# Patient Record
Sex: FEMALE | Race: WHITE | Employment: OTHER | URBAN - METROPOLITAN AREA
[De-identification: names, ages, dates, MRNs, and addresses within clinical notes are randomized per-mention and may not be internally consistent; named-entity substitution may affect disease eponyms.]

---

## 2017-03-07 ENCOUNTER — TELEPHONE (OUTPATIENT)
Dept: INTERNAL MEDICINE CLINIC | Facility: CLINIC | Age: 42
End: 2017-03-07

## 2017-03-07 RX ORDER — IRBESARTAN AND HYDROCHLOROTHIAZIDE 300; 12.5 MG/1; MG/1
1 TABLET, FILM COATED ORAL DAILY
Qty: 30 TABLET | Refills: 0 | Status: SHIPPED | OUTPATIENT
Start: 2017-03-07 | End: 2017-04-04

## 2017-04-04 ENCOUNTER — TELEPHONE (OUTPATIENT)
Dept: INTERNAL MEDICINE CLINIC | Facility: CLINIC | Age: 42
End: 2017-04-04

## 2017-04-04 RX ORDER — IRBESARTAN AND HYDROCHLOROTHIAZIDE 300; 12.5 MG/1; MG/1
1 TABLET, FILM COATED ORAL DAILY
Qty: 30 TABLET | Refills: 1 | Status: SHIPPED | OUTPATIENT
Start: 2017-04-04 | End: 2018-09-19 | Stop reason: ALTCHOICE

## 2018-07-23 ENCOUNTER — PRIOR ORIGINAL RECORDS (OUTPATIENT)
Dept: OTHER | Age: 43
End: 2018-07-23

## 2018-08-30 ENCOUNTER — PRIOR ORIGINAL RECORDS (OUTPATIENT)
Dept: OTHER | Age: 43
End: 2018-08-30

## 2018-08-31 ENCOUNTER — PRIOR ORIGINAL RECORDS (OUTPATIENT)
Dept: OTHER | Age: 43
End: 2018-08-31

## 2018-09-04 LAB
ALBUMIN: 4.5 G/DL
ALKALINE PHOSPHATATE(ALK PHOS): 145 IU/L
BILIRUBIN TOTAL: 0.6 MG/DL
BUN: 13 MG/DL
CALCIUM: 10.8 MG/DL
CHLORIDE: 95 MEQ/L
CREATININE, SERUM: 1.1 MG/DL
GLOBULIN: 3.8 G/DL
GLUCOSE: 148 MG/DL
HEMATOCRIT: 49.3 %
HEMOGLOBIN: 17.7 G/DL
PLATELETS: 303 K/UL
POTASSIUM, SERUM: 4.1 MEQ/L
PROTEIN, TOTAL: 8.3 G/DL
RED BLOOD COUNT: 5.84 X 10-6/U
SGOT (AST): 396 IU/L
SGPT (ALT): 77 IU/L
SODIUM: 136 MEQ/L
WHITE BLOOD COUNT: 22.2 X 10-3/U

## 2018-09-05 ENCOUNTER — TELEPHONE (OUTPATIENT)
Dept: INTERNAL MEDICINE CLINIC | Facility: CLINIC | Age: 43
End: 2018-09-05

## 2018-09-05 DIAGNOSIS — Z79.01 ANTICOAGULATED ON COUMADIN: Primary | ICD-10-CM

## 2018-09-06 ENCOUNTER — APPOINTMENT (OUTPATIENT)
Dept: LAB | Age: 43
End: 2018-09-06
Attending: FAMILY MEDICINE
Payer: COMMERCIAL

## 2018-09-06 DIAGNOSIS — Z79.01 ANTICOAGULATED ON COUMADIN: ICD-10-CM

## 2018-09-06 LAB
INR BLD: 2.54 (ref 0.9–1.1)
PSA SERPL DL<=0.01 NG/ML-MCNC: 28.2 SECONDS (ref 12.4–14.7)

## 2018-09-06 PROCEDURE — 85610 PROTHROMBIN TIME: CPT | Performed by: FAMILY MEDICINE

## 2018-09-06 PROCEDURE — 36415 COLL VENOUS BLD VENIPUNCTURE: CPT | Performed by: FAMILY MEDICINE

## 2018-09-06 NOTE — TELEPHONE ENCOUNTER
See Lab Steven Irby when speaking with mother yesterday pt taking coumadin  2.5mg alternate with 5 mg QOD

## 2018-09-10 RX ORDER — IRBESARTAN AND HYDROCHLOROTHIAZIDE 300; 12.5 MG/1; MG/1
1 TABLET, FILM COATED ORAL DAILY
Qty: 30 TABLET | Refills: 1 | OUTPATIENT
Start: 2018-09-10

## 2018-09-10 NOTE — TELEPHONE ENCOUNTER
[address] [facility], [address] [facility]  [address] [address] [facility] [phone], [phone]  Irbesartan-Hydrochlorothiazide 300-12.5 MG

## 2018-09-11 ENCOUNTER — TELEPHONE (OUTPATIENT)
Dept: INTERNAL MEDICINE CLINIC | Facility: CLINIC | Age: 43
End: 2018-09-11

## 2018-09-11 RX ORDER — WARFARIN SODIUM 5 MG/1
TABLET ORAL
Qty: 30 TABLET | Refills: 0 | Status: SHIPPED | OUTPATIENT
Start: 2018-09-11 | End: 2018-10-12

## 2018-09-11 NOTE — TELEPHONE ENCOUNTER
Appt 09/19/18 with Dr Claudia Abdi  Last INR 2.54 on 09/06/18 taking coumadin 2.5mg alternate with 5 mg QOD Told at that time recheck 2 weeks

## 2018-09-11 NOTE — TELEPHONE ENCOUNTER
Mother informed still need refill of coumadin 5 mg tabs only has enough thru tomorrow  Send to [facility] [address]

## 2018-09-11 NOTE — TELEPHONE ENCOUNTER
Refill for Warfarin? When will her INR be checked again, it was checked 9/6/18. Should they be concerned about menstrual period  12 day?   She states she left a message yesterday also

## 2018-09-19 ENCOUNTER — APPOINTMENT (OUTPATIENT)
Dept: LAB | Age: 43
End: 2018-09-19
Attending: FAMILY MEDICINE
Payer: COMMERCIAL

## 2018-09-19 ENCOUNTER — OFFICE VISIT (OUTPATIENT)
Dept: INTERNAL MEDICINE CLINIC | Facility: CLINIC | Age: 43
End: 2018-09-19
Payer: COMMERCIAL

## 2018-09-19 VITALS — BODY MASS INDEX: 34.65 KG/M2 | HEIGHT: 63.5 IN | WEIGHT: 198 LBS

## 2018-09-19 DIAGNOSIS — Z95.810 CARDIAC DEFIBRILLATOR IN PLACE: ICD-10-CM

## 2018-09-19 DIAGNOSIS — I25.10 CORONARY ARTERY DISEASE INVOLVING NATIVE CORONARY ARTERY OF NATIVE HEART WITHOUT ANGINA PECTORIS: Primary | ICD-10-CM

## 2018-09-19 DIAGNOSIS — F32.A DEPRESSION, UNSPECIFIED DEPRESSION TYPE: ICD-10-CM

## 2018-09-19 DIAGNOSIS — R41.89 COGNITIVE CHANGE: ICD-10-CM

## 2018-09-19 DIAGNOSIS — Z12.31 VISIT FOR SCREENING MAMMOGRAM: ICD-10-CM

## 2018-09-19 DIAGNOSIS — I10 ESSENTIAL HYPERTENSION: ICD-10-CM

## 2018-09-19 DIAGNOSIS — R73.01 IMPAIRED FASTING GLUCOSE: ICD-10-CM

## 2018-09-19 DIAGNOSIS — I25.10 CORONARY ARTERY DISEASE INVOLVING NATIVE CORONARY ARTERY OF NATIVE HEART WITHOUT ANGINA PECTORIS: ICD-10-CM

## 2018-09-19 PROCEDURE — 36415 COLL VENOUS BLD VENIPUNCTURE: CPT | Performed by: FAMILY MEDICINE

## 2018-09-19 PROCEDURE — 99396 PREV VISIT EST AGE 40-64: CPT | Performed by: FAMILY MEDICINE

## 2018-09-19 PROCEDURE — 85610 PROTHROMBIN TIME: CPT | Performed by: FAMILY MEDICINE

## 2018-09-19 RX ORDER — ATORVASTATIN CALCIUM 80 MG/1
40 TABLET, FILM COATED ORAL DAILY
COMMUNITY

## 2018-09-19 RX ORDER — CARVEDILOL 6.25 MG/1
6.25 TABLET ORAL 2 TIMES DAILY WITH MEALS
Status: ON HOLD | COMMUNITY
End: 2020-01-03

## 2018-09-19 RX ORDER — PAROXETINE HYDROCHLORIDE 25 MG/1
25 TABLET, FILM COATED, EXTENDED RELEASE ORAL EVERY MORNING
COMMUNITY
End: 2018-11-28

## 2018-09-19 RX ORDER — LISINOPRIL 2.5 MG/1
5 TABLET ORAL DAILY
COMMUNITY

## 2018-09-19 NOTE — PROGRESS NOTES
HPI:    Patient ID: Manpreet Mullins is a 38year old female.     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

## 2018-09-20 LAB
INR BLD: 1.92 (ref 0.9–1.1)
PSA SERPL DL<=0.01 NG/ML-MCNC: 22.6 SECONDS (ref 12.4–14.7)

## 2018-10-02 ENCOUNTER — PRIOR ORIGINAL RECORDS (OUTPATIENT)
Dept: OTHER | Age: 43
End: 2018-10-02

## 2018-10-02 ENCOUNTER — HOSPITAL ENCOUNTER (OUTPATIENT)
Dept: LAB | Facility: HOSPITAL | Age: 43
Discharge: HOME | End: 2018-10-02
Attending: INTERNAL MEDICINE
Payer: COMMERCIAL

## 2018-10-02 LAB — INR: NORMAL

## 2018-10-02 PROCEDURE — 85610 PROTHROMBIN TIME: CPT | Performed by: INTERNAL MEDICINE

## 2018-10-02 PROCEDURE — 36415 COLL VENOUS BLD VENIPUNCTURE: CPT | Performed by: INTERNAL MEDICINE

## 2018-10-05 NOTE — PATIENT INSTRUCTIONS
Refill policies:    • Allow 2-3 business days for refills; controlled substances may take longer.   • Contact your pharmacy at least 5 days prior to running out of medication and have them send an electronic request or submit request through the “request re entire amount billed. Precertification and Prior Authorizations: If your physician has recommended that you have a procedure or additional testing performed.   [facility]) will contact your insurance carrier to obtain pre-certi cleaned out of your hair with a warm washcloth before you leave. If you like, you can bring a hat to wear after the test or put your hair in a ponytail.    · You will be asked to relax with your eyes closed for a majority of the test and take a nap, if pos

## 2018-10-05 NOTE — PROGRESS NOTES
[address]  Neurology Consultation    Date of consult: 10/5/2018    Reason for consult: cognitive deficit     HPI: Jalyn Do is a 38year old female with past medical history as listed below presents with cognitive deficit after cardiac i cancer      Physical Examination:  /66   Pulse 66   Resp 14   Wt 198 lb   BMI 34.52 kg/m²   Lungs: clear to auscultation   CV: S1, S2 +  Abdomen: soft, non tender, no masses  Extremities: no edema  Carotid bruits: no  Neurological Examination:  Menta

## 2018-10-10 ENCOUNTER — APPOINTMENT (OUTPATIENT)
Dept: LAB | Age: 43
End: 2018-10-10
Attending: FAMILY MEDICINE
Payer: COMMERCIAL

## 2018-10-10 ENCOUNTER — PRIOR ORIGINAL RECORDS (OUTPATIENT)
Dept: OTHER | Age: 43
End: 2018-10-10

## 2018-10-10 ENCOUNTER — ANTI-COAG VISIT (OUTPATIENT)
Dept: INTERNAL MEDICINE CLINIC | Facility: CLINIC | Age: 43
End: 2018-10-10

## 2018-10-10 DIAGNOSIS — Z79.01 ANTICOAGULATED ON COUMADIN: ICD-10-CM

## 2018-10-10 LAB — INR: 2.3

## 2018-10-10 PROCEDURE — 93793 ANTICOAG MGMT PT WARFARIN: CPT | Performed by: FAMILY MEDICINE

## 2018-10-10 PROCEDURE — 85610 PROTHROMBIN TIME: CPT | Performed by: FAMILY MEDICINE

## 2018-10-10 PROCEDURE — 36415 COLL VENOUS BLD VENIPUNCTURE: CPT | Performed by: FAMILY MEDICINE

## 2018-10-11 ENCOUNTER — OFFICE VISIT (OUTPATIENT)
Dept: NUTRITION | Facility: HOSPITAL | Age: 43
End: 2018-10-11
Attending: INTERNAL MEDICINE
Payer: COMMERCIAL

## 2018-10-11 PROCEDURE — 97802 MEDICAL NUTRITION INDIV IN: CPT

## 2018-10-11 NOTE — PROGRESS NOTES
ADULT INITIAL OUTPATIENT NUTRITION CONSULTATION    Nutrition Assessment    Medical Diagnosis: CAD, HTN, Obesity    PMH: heart attack 7/18    Client Hx: 38year old female    Meds: noted    Labs: no recent    ANTHROPOMETRICS:  Ht: 5'4\"  Wt: 198#  BMI: 3 log intake via phone ishmael, MyFitnessPal and monitor nutrients. Written materials were issued and explained, all questions answered at this time.  Pt has set goals to follow recommendations set today, track intake using phone ishmael, MFP, and contact RD with fur

## 2018-10-13 ENCOUNTER — NURSE ONLY (OUTPATIENT)
Dept: NEUROLOGY | Facility: CLINIC | Age: 43
End: 2018-10-13
Payer: COMMERCIAL

## 2018-10-13 DIAGNOSIS — R41.89 COGNITIVE DEFICITS: ICD-10-CM

## 2018-10-13 PROCEDURE — 95816 EEG AWAKE AND DROWSY: CPT | Performed by: OTHER

## 2018-10-15 NOTE — PROCEDURES
Date of Procedure: 10/13/2018    Procedure: EEG (ELECTROENCEPHALOGRAM)     DX: COGNITIVE DEFICIT  HX: PT IS A 41YO FEMALE WHO PRESENTS AFTER CARDIAC ISSUES IN JULY OF THIS YEAR. PTS HAD STENT PLACEMENT AND STATES HEART WAS SHOCKED TWICE TO RESTORE RHYTHM.

## 2018-10-16 RX ORDER — WARFARIN SODIUM 5 MG/1
TABLET ORAL
Qty: 30 TABLET | Refills: 0 | Status: SHIPPED | OUTPATIENT
Start: 2018-10-16 | End: 2018-12-31

## 2018-10-18 RX ORDER — WARFARIN SODIUM 5 MG/1
TABLET ORAL
Qty: 30 TABLET | Refills: 0 | Status: SHIPPED | OUTPATIENT
Start: 2018-10-18 | End: 2018-10-26

## 2018-10-19 NOTE — IMAGING NOTE
Darling Reed from [facility] called back states ok for Crystal to take off life vest for MRI.

## 2018-10-19 NOTE — IMAGING NOTE
Spoke to pt mom, pt mom states pt saw dr. George Wilson cardiology. Called [phone] left message for Candelaria ORTEGA to call back about pt taking life vest off during MRI.

## 2018-10-19 NOTE — IMAGING NOTE
Pt mother called back and states she will take her life vest off during MRI.  That is what she has done in the past.

## 2018-10-22 ENCOUNTER — HOSPITAL ENCOUNTER (OUTPATIENT)
Dept: MRI IMAGING | Facility: HOSPITAL | Age: 43
Discharge: HOME | End: 2018-10-22
Attending: Other
Payer: COMMERCIAL

## 2018-10-22 ENCOUNTER — HOSPITAL ENCOUNTER (OUTPATIENT)
Dept: ULTRASOUND IMAGING | Facility: HOSPITAL | Age: 43
Discharge: HOME | End: 2018-10-22
Attending: Other
Payer: COMMERCIAL

## 2018-10-22 ENCOUNTER — PRIOR ORIGINAL RECORDS (OUTPATIENT)
Dept: OTHER | Age: 43
End: 2018-10-22

## 2018-10-22 VITALS
DIASTOLIC BLOOD PRESSURE: 62 MMHG | HEART RATE: 109 BPM | RESPIRATION RATE: 20 BRPM | OXYGEN SATURATION: 98 % | SYSTOLIC BLOOD PRESSURE: 116 MMHG

## 2018-10-22 DIAGNOSIS — R41.89 COGNITIVE CHANGES: ICD-10-CM

## 2018-10-22 DIAGNOSIS — I63.439 CEREBROVASCULAR ACCIDENT (CVA) DUE TO EMBOLISM OF POSTERIOR CEREBRAL ARTERY, UNSPECIFIED BLOOD VESSEL LATERALITY (HCC): ICD-10-CM

## 2018-10-22 PROCEDURE — 70553 MRI BRAIN STEM W/O & W/DYE: CPT | Performed by: OTHER

## 2018-10-22 PROCEDURE — 93880 EXTRACRANIAL BILAT STUDY: CPT | Performed by: OTHER

## 2018-10-22 PROCEDURE — A9575 INJ GADOTERATE MEGLUMI 0.1ML: HCPCS

## 2018-10-24 ENCOUNTER — LAB ENCOUNTER (OUTPATIENT)
Dept: LAB | Age: 43
End: 2018-10-24
Attending: INTERNAL MEDICINE
Payer: COMMERCIAL

## 2018-10-24 ENCOUNTER — PRIOR ORIGINAL RECORDS (OUTPATIENT)
Dept: OTHER | Age: 43
End: 2018-10-24

## 2018-10-24 DIAGNOSIS — Z79.01 LONG TERM (CURRENT) USE OF ANTICOAGULANTS: Primary | ICD-10-CM

## 2018-10-24 LAB — INR: 1.7

## 2018-10-24 PROCEDURE — 85610 PROTHROMBIN TIME: CPT

## 2018-10-24 PROCEDURE — 36415 COLL VENOUS BLD VENIPUNCTURE: CPT

## 2018-10-26 ENCOUNTER — CARDPULM VISIT (OUTPATIENT)
Dept: CARDIAC REHAB | Facility: HOSPITAL | Age: 43
End: 2018-10-26
Attending: INTERNAL MEDICINE
Payer: COMMERCIAL

## 2018-10-30 ENCOUNTER — HOSPITAL (OUTPATIENT)
Dept: OTHER | Age: 43
End: 2018-10-30
Attending: INTERNAL MEDICINE

## 2018-11-01 ENCOUNTER — PRIOR ORIGINAL RECORDS (OUTPATIENT)
Dept: OTHER | Age: 43
End: 2018-11-01

## 2018-11-02 ENCOUNTER — PRIOR ORIGINAL RECORDS (OUTPATIENT)
Dept: OTHER | Age: 43
End: 2018-11-02

## 2018-11-02 ENCOUNTER — TELEPHONE (OUTPATIENT)
Dept: NEUROLOGY | Facility: CLINIC | Age: 43
End: 2018-11-02

## 2018-11-05 ENCOUNTER — CARDPULM VISIT (OUTPATIENT)
Dept: CARDIAC REHAB | Facility: HOSPITAL | Age: 43
End: 2018-11-05
Attending: INTERNAL MEDICINE
Payer: COMMERCIAL

## 2018-11-05 PROCEDURE — 93798 PHYS/QHP OP CAR RHAB W/ECG: CPT

## 2018-11-06 ENCOUNTER — APPOINTMENT (OUTPATIENT)
Dept: LAB | Age: 43
End: 2018-11-06
Attending: FAMILY MEDICINE
Payer: COMMERCIAL

## 2018-11-06 ENCOUNTER — PRIOR ORIGINAL RECORDS (OUTPATIENT)
Dept: OTHER | Age: 43
End: 2018-11-06

## 2018-11-06 ENCOUNTER — MYAURORA ACCOUNT LINK (OUTPATIENT)
Dept: OTHER | Age: 43
End: 2018-11-06

## 2018-11-06 ENCOUNTER — HOSPITAL ENCOUNTER (OUTPATIENT)
Dept: CV DIAGNOSTICS | Age: 43
Discharge: HOME | End: 2018-11-06
Attending: INTERNAL MEDICINE
Payer: COMMERCIAL

## 2018-11-06 DIAGNOSIS — Z79.01 LONG TERM (CURRENT) USE OF ANTICOAGULANTS: ICD-10-CM

## 2018-11-06 DIAGNOSIS — I25.5 ISCHEMIC CARDIOMYOPATHY: ICD-10-CM

## 2018-11-06 LAB — INR: 2.36

## 2018-11-06 PROCEDURE — 85610 PROTHROMBIN TIME: CPT

## 2018-11-06 PROCEDURE — 36415 COLL VENOUS BLD VENIPUNCTURE: CPT

## 2018-11-07 ENCOUNTER — CARDPULM VISIT (OUTPATIENT)
Dept: CARDIAC REHAB | Facility: HOSPITAL | Age: 43
End: 2018-11-07
Attending: INTERNAL MEDICINE
Payer: COMMERCIAL

## 2018-11-07 PROCEDURE — 93798 PHYS/QHP OP CAR RHAB W/ECG: CPT

## 2018-11-08 ENCOUNTER — TELEPHONE (OUTPATIENT)
Dept: NEUROLOGY | Facility: CLINIC | Age: 43
End: 2018-11-08

## 2018-11-08 NOTE — TELEPHONE ENCOUNTER
Pt went to the [place] office instead of the [place] office for f/up with Dr Immanuel Charles. [place] office sent pt to [place]. Unfortunately, pt arrived 35 minutes late for appointment and  was unable to see patient due to a full morning schedule.

## 2018-11-09 ENCOUNTER — CARDPULM VISIT (OUTPATIENT)
Dept: CARDIAC REHAB | Facility: HOSPITAL | Age: 43
End: 2018-11-09
Attending: INTERNAL MEDICINE
Payer: COMMERCIAL

## 2018-11-09 ENCOUNTER — PRIOR ORIGINAL RECORDS (OUTPATIENT)
Dept: OTHER | Age: 43
End: 2018-11-09

## 2018-11-09 PROCEDURE — 93798 PHYS/QHP OP CAR RHAB W/ECG: CPT

## 2018-11-12 ENCOUNTER — PRIOR ORIGINAL RECORDS (OUTPATIENT)
Dept: OTHER | Age: 43
End: 2018-11-12

## 2018-11-12 ENCOUNTER — CARDPULM VISIT (OUTPATIENT)
Dept: CARDIAC REHAB | Facility: HOSPITAL | Age: 43
End: 2018-11-12
Attending: INTERNAL MEDICINE
Payer: COMMERCIAL

## 2018-11-12 PROCEDURE — 93798 PHYS/QHP OP CAR RHAB W/ECG: CPT

## 2018-11-13 ENCOUNTER — PRIOR ORIGINAL RECORDS (OUTPATIENT)
Dept: OTHER | Age: 43
End: 2018-11-13

## 2018-11-13 ENCOUNTER — MYAURORA ACCOUNT LINK (OUTPATIENT)
Dept: OTHER | Age: 43
End: 2018-11-13

## 2018-11-14 ENCOUNTER — CARDPULM VISIT (OUTPATIENT)
Dept: CARDIAC REHAB | Facility: HOSPITAL | Age: 43
End: 2018-11-14
Attending: INTERNAL MEDICINE
Payer: COMMERCIAL

## 2018-11-14 PROCEDURE — 93798 PHYS/QHP OP CAR RHAB W/ECG: CPT

## 2018-11-16 ENCOUNTER — CARDPULM VISIT (OUTPATIENT)
Dept: CARDIAC REHAB | Facility: HOSPITAL | Age: 43
End: 2018-11-16
Attending: INTERNAL MEDICINE
Payer: COMMERCIAL

## 2018-11-16 PROCEDURE — 93798 PHYS/QHP OP CAR RHAB W/ECG: CPT

## 2018-11-19 ENCOUNTER — CARDPULM VISIT (OUTPATIENT)
Dept: CARDIAC REHAB | Facility: HOSPITAL | Age: 43
End: 2018-11-19
Attending: INTERNAL MEDICINE
Payer: COMMERCIAL

## 2018-11-19 PROCEDURE — 93798 PHYS/QHP OP CAR RHAB W/ECG: CPT

## 2018-11-21 ENCOUNTER — PRIOR ORIGINAL RECORDS (OUTPATIENT)
Dept: OTHER | Age: 43
End: 2018-11-21

## 2018-11-21 ENCOUNTER — CARDPULM VISIT (OUTPATIENT)
Dept: CARDIAC REHAB | Facility: HOSPITAL | Age: 43
End: 2018-11-21
Attending: INTERNAL MEDICINE
Payer: COMMERCIAL

## 2018-11-21 PROCEDURE — 93798 PHYS/QHP OP CAR RHAB W/ECG: CPT

## 2018-11-23 ENCOUNTER — CARDPULM VISIT (OUTPATIENT)
Dept: CARDIAC REHAB | Facility: HOSPITAL | Age: 43
End: 2018-11-23
Attending: INTERNAL MEDICINE
Payer: COMMERCIAL

## 2018-11-23 PROCEDURE — 93798 PHYS/QHP OP CAR RHAB W/ECG: CPT

## 2018-11-26 ENCOUNTER — CARDPULM VISIT (OUTPATIENT)
Dept: CARDIAC REHAB | Facility: HOSPITAL | Age: 43
End: 2018-11-26
Attending: INTERNAL MEDICINE
Payer: COMMERCIAL

## 2018-11-26 PROCEDURE — 93798 PHYS/QHP OP CAR RHAB W/ECG: CPT

## 2018-11-27 ENCOUNTER — OFFICE VISIT (OUTPATIENT)
Dept: NEUROLOGY | Facility: CLINIC | Age: 43
End: 2018-11-27
Payer: COMMERCIAL

## 2018-11-27 VITALS
BODY MASS INDEX: 36 KG/M2 | RESPIRATION RATE: 16 BRPM | DIASTOLIC BLOOD PRESSURE: 74 MMHG | SYSTOLIC BLOOD PRESSURE: 118 MMHG | HEART RATE: 80 BPM | WEIGHT: 205 LBS

## 2018-11-27 DIAGNOSIS — I63.332 CEREBROVASCULAR ACCIDENT (CVA) DUE TO THROMBOSIS OF LEFT POSTERIOR CEREBRAL ARTERY (HCC): Primary | ICD-10-CM

## 2018-11-27 PROCEDURE — 99215 OFFICE O/P EST HI 40 MIN: CPT | Performed by: OTHER

## 2018-11-27 NOTE — PROGRESS NOTES
Patient here to follow up regarding cognitive impairment. Has been doing better since last visit. Has completed testing, here to discuss the results and next steps.

## 2018-11-27 NOTE — PROGRESS NOTES
[facility] Neurology outpatient progress note  Date of service: 11/27/2018    Patient here to follow up regarding cognitive impairment. Has been doing slightly better since last visit. Has completed testing, here to discuss the results and next steps.   [address] [place] [address] Drive increase blood flow.  implanted in groin     Social History:  Social History    Tobacco Use      Smoking status: Former Smoker        Packs/day: 0.00        Years: 0.00        Pack years: 0        Quit date: 2018        Years since quittin.3      S PCA territory stroke  CAD s/p stent  HTN     Recommendations:  Cont current meds per Cardiology/PCP, monitor INR and signs of bleeding  Cardiology, PCP follow up  Brain exercise games advised  Stroke education given  Stroke risk factors management by prima

## 2018-11-28 ENCOUNTER — PATIENT MESSAGE (OUTPATIENT)
Dept: INTERNAL MEDICINE CLINIC | Facility: CLINIC | Age: 43
End: 2018-11-28

## 2018-11-28 ENCOUNTER — CARDPULM VISIT (OUTPATIENT)
Dept: CARDIAC REHAB | Facility: HOSPITAL | Age: 43
End: 2018-11-28
Attending: INTERNAL MEDICINE
Payer: COMMERCIAL

## 2018-11-28 PROCEDURE — 93798 PHYS/QHP OP CAR RHAB W/ECG: CPT

## 2018-11-28 NOTE — TELEPHONE ENCOUNTER
From: Tu Killian  To: Loretta Reyes MD  Sent: 11/28/2018 3:28 PM CST  Subject: Prescription Question    Would you please send a prescription for paroxetine er 25mg once daily to [address]  I have my follow up on 12-12 at three p.m. thank

## 2018-11-29 RX ORDER — PAROXETINE HYDROCHLORIDE 25 MG/1
25 TABLET, FILM COATED, EXTENDED RELEASE ORAL EVERY MORNING
Qty: 30 TABLET | Refills: 0 | Status: SHIPPED | OUTPATIENT
Start: 2018-11-29 | End: 2018-12-31

## 2018-11-29 NOTE — PROGRESS NOTES
Refill requested:   Requested Prescriptions     Pending Prescriptions Disp Refills   • PARoxetine HCl ER 25 MG Oral Tablet 24 Hr 30 tablet 0     Sig: Take 1 tablet (25 mg total) by mouth every morning.        Failed protocol    Last refill: - entered histor cancel.   Please verify with your primary care provider if your insurance requires a referral.    Dec 14, 2018 10:45 AM CST CARDIAC REHAB PHASE II with [id number] [facility] [address] CARDIAC REHAB PHASE II with [address] [facility])    Jan 11, 2019 10:45 AM CST CARDIAC REHAB PHASE II with [address] [facility] [place]

## 2018-11-30 ENCOUNTER — APPOINTMENT (OUTPATIENT)
Dept: CARDIAC REHAB | Facility: HOSPITAL | Age: 43
End: 2018-11-30
Attending: INTERNAL MEDICINE
Payer: COMMERCIAL

## 2018-12-03 ENCOUNTER — APPOINTMENT (OUTPATIENT)
Dept: CARDIAC REHAB | Facility: HOSPITAL | Age: 43
End: 2018-12-03
Attending: INTERNAL MEDICINE
Payer: COMMERCIAL

## 2018-12-03 ENCOUNTER — TELEPHONE (OUTPATIENT)
Dept: INTERNAL MEDICINE CLINIC | Facility: CLINIC | Age: 43
End: 2018-12-03

## 2018-12-03 ENCOUNTER — PRIOR ORIGINAL RECORDS (OUTPATIENT)
Dept: OTHER | Age: 43
End: 2018-12-03

## 2018-12-03 LAB — INR: 1.5

## 2018-12-03 NOTE — TELEPHONE ENCOUNTER
called and stated that his wife is currently in [place] and she needs to have her INR blood work done. He wants to know if the order could be changed over to [place]. Fax # [phone] Phone # [phone]. Please advise.

## 2018-12-05 ENCOUNTER — APPOINTMENT (OUTPATIENT)
Dept: CARDIAC REHAB | Facility: HOSPITAL | Age: 43
End: 2018-12-05
Attending: INTERNAL MEDICINE
Payer: COMMERCIAL

## 2018-12-07 ENCOUNTER — APPOINTMENT (OUTPATIENT)
Dept: CARDIAC REHAB | Facility: HOSPITAL | Age: 43
End: 2018-12-07
Attending: INTERNAL MEDICINE
Payer: COMMERCIAL

## 2018-12-10 ENCOUNTER — PRIOR ORIGINAL RECORDS (OUTPATIENT)
Dept: OTHER | Age: 43
End: 2018-12-10

## 2018-12-10 ENCOUNTER — APPOINTMENT (OUTPATIENT)
Dept: CARDIAC REHAB | Facility: HOSPITAL | Age: 43
End: 2018-12-10
Attending: INTERNAL MEDICINE
Payer: COMMERCIAL

## 2018-12-10 LAB — INR: 2.7

## 2018-12-12 ENCOUNTER — APPOINTMENT (OUTPATIENT)
Dept: CARDIAC REHAB | Facility: HOSPITAL | Age: 43
End: 2018-12-12
Attending: INTERNAL MEDICINE
Payer: COMMERCIAL

## 2018-12-14 ENCOUNTER — APPOINTMENT (OUTPATIENT)
Dept: CARDIAC REHAB | Facility: HOSPITAL | Age: 43
End: 2018-12-14
Attending: INTERNAL MEDICINE
Payer: COMMERCIAL

## 2018-12-17 ENCOUNTER — APPOINTMENT (OUTPATIENT)
Dept: CARDIAC REHAB | Facility: HOSPITAL | Age: 43
End: 2018-12-17
Attending: INTERNAL MEDICINE
Payer: COMMERCIAL

## 2018-12-17 ENCOUNTER — PRIOR ORIGINAL RECORDS (OUTPATIENT)
Dept: OTHER | Age: 43
End: 2018-12-17

## 2018-12-17 LAB — INR: 1.82

## 2018-12-19 ENCOUNTER — APPOINTMENT (OUTPATIENT)
Dept: CARDIAC REHAB | Facility: HOSPITAL | Age: 43
End: 2018-12-19
Attending: INTERNAL MEDICINE
Payer: COMMERCIAL

## 2018-12-21 ENCOUNTER — APPOINTMENT (OUTPATIENT)
Dept: CARDIAC REHAB | Facility: HOSPITAL | Age: 43
End: 2018-12-21
Attending: INTERNAL MEDICINE
Payer: COMMERCIAL

## 2018-12-24 ENCOUNTER — APPOINTMENT (OUTPATIENT)
Dept: CARDIAC REHAB | Facility: HOSPITAL | Age: 43
End: 2018-12-24
Attending: INTERNAL MEDICINE
Payer: COMMERCIAL

## 2018-12-26 ENCOUNTER — APPOINTMENT (OUTPATIENT)
Dept: CARDIAC REHAB | Facility: HOSPITAL | Age: 43
End: 2018-12-26
Attending: INTERNAL MEDICINE
Payer: COMMERCIAL

## 2018-12-28 ENCOUNTER — APPOINTMENT (OUTPATIENT)
Dept: CARDIAC REHAB | Facility: HOSPITAL | Age: 43
End: 2018-12-28
Attending: INTERNAL MEDICINE
Payer: COMMERCIAL

## 2018-12-31 ENCOUNTER — APPOINTMENT (OUTPATIENT)
Dept: CARDIAC REHAB | Facility: HOSPITAL | Age: 43
End: 2018-12-31
Attending: INTERNAL MEDICINE
Payer: COMMERCIAL

## 2018-12-31 ENCOUNTER — PRIOR ORIGINAL RECORDS (OUTPATIENT)
Dept: OTHER | Age: 43
End: 2018-12-31

## 2018-12-31 ENCOUNTER — APPOINTMENT (OUTPATIENT)
Dept: LAB | Age: 43
End: 2018-12-31
Attending: FAMILY MEDICINE
Payer: COMMERCIAL

## 2018-12-31 DIAGNOSIS — Z95.810 CARDIAC DEFIBRILLATOR IN PLACE: ICD-10-CM

## 2018-12-31 DIAGNOSIS — I25.10 CORONARY ARTERY DISEASE INVOLVING NATIVE CORONARY ARTERY OF NATIVE HEART WITHOUT ANGINA PECTORIS: ICD-10-CM

## 2018-12-31 LAB
INR BLD: 1.67 (ref 0.9–1.1)
INR: 1.67
PSA SERPL DL<=0.01 NG/ML-MCNC: 20.3 SECONDS (ref 12.4–14.7)

## 2018-12-31 PROCEDURE — 36415 COLL VENOUS BLD VENIPUNCTURE: CPT | Performed by: FAMILY MEDICINE

## 2018-12-31 PROCEDURE — 85610 PROTHROMBIN TIME: CPT | Performed by: FAMILY MEDICINE

## 2018-12-31 NOTE — TELEPHONE ENCOUNTER
Paroxetine last filled 11/29/18 #30     Warfarin Last filled 10/16/18 #30     Last PT  12/31/18 in process

## 2019-01-01 ENCOUNTER — EXTERNAL RECORD (OUTPATIENT)
Dept: HEALTH INFORMATION MANAGEMENT | Facility: OTHER | Age: 44
End: 2019-01-01

## 2019-01-01 ENCOUNTER — ANTI-COAG VISIT (OUTPATIENT)
Dept: INTERNAL MEDICINE CLINIC | Facility: CLINIC | Age: 44
End: 2019-01-01

## 2019-01-01 PROCEDURE — 93793 ANTICOAG MGMT PT WARFARIN: CPT | Performed by: FAMILY MEDICINE

## 2019-01-01 RX ORDER — WARFARIN SODIUM 5 MG/1
TABLET ORAL
Qty: 30 TABLET | Refills: 0 | Status: SHIPPED | OUTPATIENT
Start: 2019-01-01 | End: 2019-01-25

## 2019-01-01 RX ORDER — PAROXETINE HYDROCHLORIDE 25 MG/1
TABLET, FILM COATED, EXTENDED RELEASE ORAL
Qty: 30 TABLET | Refills: 0 | Status: SHIPPED | OUTPATIENT
Start: 2019-01-01 | End: 2019-01-25

## 2019-01-02 ENCOUNTER — APPOINTMENT (OUTPATIENT)
Dept: CARDIAC REHAB | Facility: HOSPITAL | Age: 44
End: 2019-01-02
Attending: INTERNAL MEDICINE

## 2019-01-02 ENCOUNTER — TELEPHONE (OUTPATIENT)
Dept: INTERNAL MEDICINE CLINIC | Facility: CLINIC | Age: 44
End: 2019-01-02

## 2019-01-02 NOTE — TELEPHONE ENCOUNTER
Spoke with pt she is currently on 5 mg daily . She was in [place] for a while and just came back Has not heard from the coumadin clinic yet they were handling INR  in the past

## 2019-01-04 ENCOUNTER — APPOINTMENT (OUTPATIENT)
Dept: CARDIAC REHAB | Facility: HOSPITAL | Age: 44
End: 2019-01-04
Attending: INTERNAL MEDICINE

## 2019-01-07 ENCOUNTER — OFFICE VISIT (OUTPATIENT)
Dept: INTERNAL MEDICINE CLINIC | Facility: CLINIC | Age: 44
End: 2019-01-07
Payer: COMMERCIAL

## 2019-01-07 ENCOUNTER — APPOINTMENT (OUTPATIENT)
Dept: CARDIAC REHAB | Facility: HOSPITAL | Age: 44
End: 2019-01-07
Attending: INTERNAL MEDICINE

## 2019-01-07 VITALS
WEIGHT: 203 LBS | OXYGEN SATURATION: 99 % | BODY MASS INDEX: 35.52 KG/M2 | RESPIRATION RATE: 16 BRPM | SYSTOLIC BLOOD PRESSURE: 118 MMHG | HEART RATE: 82 BPM | HEIGHT: 63.25 IN | TEMPERATURE: 99 F | DIASTOLIC BLOOD PRESSURE: 80 MMHG

## 2019-01-07 DIAGNOSIS — I25.10 CORONARY ARTERY DISEASE INVOLVING NATIVE CORONARY ARTERY OF NATIVE HEART WITHOUT ANGINA PECTORIS: Primary | ICD-10-CM

## 2019-01-07 DIAGNOSIS — I63.9 CEREBROVASCULAR ACCIDENT (CVA), UNSPECIFIED MECHANISM (HCC): ICD-10-CM

## 2019-01-07 DIAGNOSIS — R41.89 COGNITIVE CHANGE: ICD-10-CM

## 2019-01-07 PROCEDURE — 99213 OFFICE O/P EST LOW 20 MIN: CPT | Performed by: FAMILY MEDICINE

## 2019-01-07 NOTE — PROGRESS NOTES
HPI:    Patient ID: Laverne  is a 37year old female. HPI  Laverne  is a 37year old female.   HPI:   Here for f/u   Has some Qs     Seeing cardiology   Heart seems to be aqueezing better according to them   No CP   On meds as directed feels well otherwise  SKIN: denies rashes  RESPIRATORY: denies shortness of breath   CARDIOVASCULAR: denies chest pain on exertion  GI: denies abdominal pain  NEURO: denies headaches    EXAM:   /80   Pulse 82   Temp 98.5 °F (36.9 °C) (Oral)   Resp 16 encounter       Imaging & Referrals:  None       #[id number]

## 2019-01-09 ENCOUNTER — APPOINTMENT (OUTPATIENT)
Dept: LAB | Age: 44
End: 2019-01-09
Attending: INTERNAL MEDICINE
Payer: COMMERCIAL

## 2019-01-09 ENCOUNTER — APPOINTMENT (OUTPATIENT)
Dept: CARDIAC REHAB | Facility: HOSPITAL | Age: 44
End: 2019-01-09
Attending: INTERNAL MEDICINE

## 2019-01-09 ENCOUNTER — PRIOR ORIGINAL RECORDS (OUTPATIENT)
Dept: OTHER | Age: 44
End: 2019-01-09

## 2019-01-09 DIAGNOSIS — Z79.01 LONG TERM (CURRENT) USE OF ANTICOAGULANTS: ICD-10-CM

## 2019-01-09 LAB
INR BLD: 2.34 (ref 0.9–1.1)
INR: 2.34
PSA SERPL DL<=0.01 NG/ML-MCNC: 26.4 SECONDS (ref 12.4–14.7)

## 2019-01-09 PROCEDURE — 85610 PROTHROMBIN TIME: CPT

## 2019-01-09 PROCEDURE — 36415 COLL VENOUS BLD VENIPUNCTURE: CPT

## 2019-01-11 ENCOUNTER — APPOINTMENT (OUTPATIENT)
Dept: CARDIAC REHAB | Facility: HOSPITAL | Age: 44
End: 2019-01-11
Attending: INTERNAL MEDICINE

## 2019-01-11 ENCOUNTER — TELEPHONE (OUTPATIENT)
Dept: INTERNAL MEDICINE CLINIC | Facility: CLINIC | Age: 44
End: 2019-01-11

## 2019-01-11 NOTE — TELEPHONE ENCOUNTER
Advised mom referral done for speech therapy for cognitive Advise to call central scheduling for appt

## 2019-01-14 ENCOUNTER — APPOINTMENT (OUTPATIENT)
Dept: CARDIAC REHAB | Facility: HOSPITAL | Age: 44
End: 2019-01-14
Attending: INTERNAL MEDICINE

## 2019-01-15 ENCOUNTER — HOSPITAL ENCOUNTER (OUTPATIENT)
Dept: SPEECH THERAPY | Facility: HOSPITAL | Age: 44
Setting detail: THERAPIES SERIES
Discharge: HOME | End: 2019-01-15
Attending: FAMILY MEDICINE
Payer: COMMERCIAL

## 2019-01-15 DIAGNOSIS — R41.89 COGNITIVE CHANGE: ICD-10-CM

## 2019-01-15 DIAGNOSIS — I63.9 CEREBROVASCULAR ACCIDENT (CVA), UNSPECIFIED MECHANISM (HCC): ICD-10-CM

## 2019-01-15 PROCEDURE — 92523 SPEECH SOUND LANG COMPREHEN: CPT

## 2019-01-15 NOTE — PROGRESS NOTES
ADULT SPEECH/LANGUAGE EVALUATION  Referring Physician: Dr. Joss Stone  Diagnosis: Cognitive change (R41.89); Cerebrovascular accident (CVA), unspecified mechanism  (I63.9)   Date of Service: 1/15/2019   Speech-language evaluation completed per MD order.   Martín RAYMONDA.    ASSESSMENT  Patient presented to [facility] outpatient speech therapy department for a speech-language evaluation due to decreased concentration, difficulty communicating, and reading problems secondary to stroke suspected in July 2018. Zoe repetitions and re-wording in order to participate in dialogue. Written Expression: not assessed due to time constraints; testing required. Reading Comprehension: not assessed due to time constraints; testing required.     COGNITIVE-COMMUNICATIVE SKILLS tasks (eg: attention, problem-solving/reasoning, visuospatial) with 80% accuracy given use of compensatory strategies and min verbal/visual cues to facilitate cognitive skills (3 months).     Frequency / Duration: Patient will be seen for 1-2x/week over a

## 2019-01-16 ENCOUNTER — APPOINTMENT (OUTPATIENT)
Dept: CARDIAC REHAB | Facility: HOSPITAL | Age: 44
End: 2019-01-16
Attending: INTERNAL MEDICINE

## 2019-01-16 ENCOUNTER — OFFICE VISIT (OUTPATIENT)
Dept: NEUROLOGY | Facility: CLINIC | Age: 44
End: 2019-01-16
Payer: COMMERCIAL

## 2019-01-16 ENCOUNTER — TELEPHONE (OUTPATIENT)
Dept: NEUROLOGY | Facility: CLINIC | Age: 44
End: 2019-01-16

## 2019-01-16 VITALS
SYSTOLIC BLOOD PRESSURE: 128 MMHG | HEART RATE: 76 BPM | DIASTOLIC BLOOD PRESSURE: 74 MMHG | BODY MASS INDEX: 37 KG/M2 | WEIGHT: 208 LBS | RESPIRATION RATE: 18 BRPM

## 2019-01-16 DIAGNOSIS — I63.439 CEREBROVASCULAR ACCIDENT (CVA) DUE TO EMBOLISM OF POSTERIOR CEREBRAL ARTERY, UNSPECIFIED BLOOD VESSEL LATERALITY (HCC): Primary | ICD-10-CM

## 2019-01-16 DIAGNOSIS — R41.89 COGNITIVE CHANGE: ICD-10-CM

## 2019-01-16 PROCEDURE — 99215 OFFICE O/P EST HI 40 MIN: CPT | Performed by: OTHER

## 2019-01-16 NOTE — TELEPHONE ENCOUNTER
Pt signed an YESI that covers anyone who requests information (ie, [employer], dr's, etc). Signed YESI sent to Scanning Department.

## 2019-01-16 NOTE — PROGRESS NOTES
[facility] Neurology outpatient progress note  Date of service: 1/16/2019    Patient here to follow up regarding stroke and residual cognitive impairment. Has been improving gradually since last visit.   Eliane Yeager is a 37year old female with cognitive de • Psoriasis      Past Surgical History:   Procedure Laterality Date   • IR STENT      7/2018   • OTHER      Impalla insertion in groin to increase blood flow.  implanted in groin     Social History:  Social History    Tobacco Use      Smoking status: For Cerebrovascular accident (CVA) due to thrombosis of left posterior cerebral artery; chronic  Cognitive deficit secondary to stroke: improved  Right hemianopsia; secondary to left PCA territory stroke  CAD s/p stent  HTN     Recommendations:  Cont current

## 2019-01-18 ENCOUNTER — APPOINTMENT (OUTPATIENT)
Dept: CARDIAC REHAB | Facility: HOSPITAL | Age: 44
End: 2019-01-18
Attending: INTERNAL MEDICINE

## 2019-01-21 ENCOUNTER — APPOINTMENT (OUTPATIENT)
Dept: CARDIAC REHAB | Facility: HOSPITAL | Age: 44
End: 2019-01-21
Attending: INTERNAL MEDICINE

## 2019-01-22 ENCOUNTER — APPOINTMENT (OUTPATIENT)
Dept: LAB | Age: 44
End: 2019-01-22
Attending: FAMILY MEDICINE
Payer: COMMERCIAL

## 2019-01-22 ENCOUNTER — APPOINTMENT (OUTPATIENT)
Dept: OCCUPATIONAL MEDICINE | Facility: HOSPITAL | Age: 44
End: 2019-01-22
Attending: Other

## 2019-01-22 ENCOUNTER — PRIOR ORIGINAL RECORDS (OUTPATIENT)
Dept: OTHER | Age: 44
End: 2019-01-22

## 2019-01-22 ENCOUNTER — APPOINTMENT (OUTPATIENT)
Dept: SPEECH THERAPY | Facility: HOSPITAL | Age: 44
End: 2019-01-22
Attending: FAMILY MEDICINE
Payer: COMMERCIAL

## 2019-01-22 DIAGNOSIS — I25.10 CORONARY ARTERY DISEASE INVOLVING NATIVE CORONARY ARTERY OF NATIVE HEART WITHOUT ANGINA PECTORIS: ICD-10-CM

## 2019-01-22 DIAGNOSIS — Z95.810 CARDIAC DEFIBRILLATOR IN PLACE: ICD-10-CM

## 2019-01-22 LAB
INR BLD: 2.27 (ref 0.9–1.1)
INR: 2.27
PSA SERPL DL<=0.01 NG/ML-MCNC: 25.8 SECONDS (ref 12.4–14.7)

## 2019-01-22 PROCEDURE — 36415 COLL VENOUS BLD VENIPUNCTURE: CPT | Performed by: FAMILY MEDICINE

## 2019-01-22 PROCEDURE — 85610 PROTHROMBIN TIME: CPT | Performed by: FAMILY MEDICINE

## 2019-01-23 ENCOUNTER — HOSPITAL ENCOUNTER (OUTPATIENT)
Dept: SPEECH THERAPY | Facility: HOSPITAL | Age: 44
Setting detail: THERAPIES SERIES
Discharge: HOME | End: 2019-01-23
Attending: FAMILY MEDICINE
Payer: COMMERCIAL

## 2019-01-23 ENCOUNTER — APPOINTMENT (OUTPATIENT)
Dept: CARDIAC REHAB | Facility: HOSPITAL | Age: 44
End: 2019-01-23
Attending: INTERNAL MEDICINE

## 2019-01-23 PROCEDURE — 92507 TX SP LANG VOICE COMM INDIV: CPT

## 2019-01-23 NOTE — PROGRESS NOTES
Treatment #2 (PPO 90 visits; PN due 4/15/19)   Treatment Time: 60 minutes  Precautions: standard      Charges: 1 billed ([id number])  Pain: 0/10       Diagnosis: Cognitive change (R41.89);  Cerebrovascular accident (CVA), unspecified mechanism  (I63.9) verbal and written instruction. Patient/mother verbalized understanding and stated she will create a log of personally relevant words that she is having difficulty with.     STG 3: Patient will complete executive function tasks (eg: attention, problem-solv

## 2019-01-24 ENCOUNTER — HOSPITAL ENCOUNTER (OUTPATIENT)
Dept: OCCUPATIONAL MEDICINE | Facility: HOSPITAL | Age: 44
Setting detail: THERAPIES SERIES
Discharge: HOME | End: 2019-01-24
Attending: FAMILY MEDICINE
Payer: COMMERCIAL

## 2019-01-24 DIAGNOSIS — I63.439 CEREBROVASCULAR ACCIDENT (CVA) DUE TO EMBOLISM OF POSTERIOR CEREBRAL ARTERY, UNSPECIFIED BLOOD VESSEL LATERALITY (HCC): ICD-10-CM

## 2019-01-24 DIAGNOSIS — R41.89 COGNITIVE CHANGE: ICD-10-CM

## 2019-01-24 PROCEDURE — 97167 OT EVAL HIGH COMPLEX 60 MIN: CPT

## 2019-01-24 PROCEDURE — 97530 THERAPEUTIC ACTIVITIES: CPT

## 2019-01-24 NOTE — PROGRESS NOTES
OCCUPATIONAL THERAPY NEURO EVALUATION:.   Referring Physician: Dr. Tori Carranza  Diagnosis:  Cerebrovascular accident (CVA) due to embolism of posterior cerebral artery    Date of Service: 1/24/2019     PATIENT SUMMARY   Riki Rincon is a 37year old y/o fe problem solving, flexible thinking, perception, processing, insight into deficits, and use of adaptive techniques. These impact ability to complete meal prep, medication management, money management, IADLs, reading, and community re-entry.    Star cancella used to examine R awareness. Initially, pt missed all targets on the R side, but was able to adjust with additional time. Today's Treatment: Introduced visual field HEP to provide stimulation to the boarder of the visual field loss.   Pt able to demons include: Neuromuscular Re-education, Therapeutic Exercise, Therapeutic Activity, cognitive retraining, IADLs, and visual retraining.        Education or treatment limitation: None  Rehab Potential:good      Patient/Family/Caregiver was advised of these find

## 2019-01-25 ENCOUNTER — APPOINTMENT (OUTPATIENT)
Dept: CARDIAC REHAB | Facility: HOSPITAL | Age: 44
End: 2019-01-25
Attending: INTERNAL MEDICINE

## 2019-01-25 RX ORDER — PAROXETINE HYDROCHLORIDE HEMIHYDRATE 25 MG/1
TABLET, FILM COATED, EXTENDED RELEASE ORAL
Qty: 90 TABLET | Refills: 2 | Status: SHIPPED | OUTPATIENT
Start: 2019-01-25 | End: 2019-04-25 | Stop reason: ALTCHOICE

## 2019-01-26 RX ORDER — WARFARIN SODIUM 5 MG/1
TABLET ORAL
Qty: 30 TABLET | Refills: 0 | Status: SHIPPED | OUTPATIENT
Start: 2019-01-26 | End: 2019-09-20 | Stop reason: ALTCHOICE

## 2019-01-29 ENCOUNTER — APPOINTMENT (OUTPATIENT)
Dept: SPEECH THERAPY | Facility: HOSPITAL | Age: 44
End: 2019-01-29
Attending: FAMILY MEDICINE
Payer: COMMERCIAL

## 2019-01-29 ENCOUNTER — TELEPHONE (OUTPATIENT)
Dept: NEUROLOGY | Facility: CLINIC | Age: 44
End: 2019-01-29

## 2019-01-31 ENCOUNTER — APPOINTMENT (OUTPATIENT)
Dept: OCCUPATIONAL MEDICINE | Facility: HOSPITAL | Age: 44
End: 2019-01-31
Attending: FAMILY MEDICINE
Payer: COMMERCIAL

## 2019-02-04 NOTE — TELEPHONE ENCOUNTER
Initiated forms and placed in Dr. Alexa Travis folder for review. Also attached YESI that was scanned into Epic.

## 2019-02-05 ENCOUNTER — HOSPITAL ENCOUNTER (OUTPATIENT)
Dept: SPEECH THERAPY | Facility: HOSPITAL | Age: 44
Setting detail: THERAPIES SERIES
Discharge: HOME | End: 2019-02-05
Attending: FAMILY MEDICINE
Payer: COMMERCIAL

## 2019-02-05 ENCOUNTER — APPOINTMENT (OUTPATIENT)
Dept: OCCUPATIONAL MEDICINE | Facility: HOSPITAL | Age: 44
End: 2019-02-05
Attending: Other

## 2019-02-05 PROCEDURE — 92507 TX SP LANG VOICE COMM INDIV: CPT

## 2019-02-05 NOTE — PROGRESS NOTES
Treatment #3 (PPO 90 visits; PN due 4/15/19)   Treatment Time: 60 minutes  Precautions: standard      Charges: 1 billed ([id number])  Pain: 0/10       Diagnosis: Cognitive change (R41.89);  Cerebrovascular accident (CVA), unspecified mechanism  (I63.9) independently increasing to 80% given mod-max verbal/visual cues. Assessment: Patient presents with cognitive-communication deficits.  Cognitive deficits are characterized by decreased execution function and attention/memory skills; communication deficit

## 2019-02-06 ENCOUNTER — MYAURORA ACCOUNT LINK (OUTPATIENT)
Dept: OTHER | Age: 44
End: 2019-02-06

## 2019-02-06 ENCOUNTER — PRIOR ORIGINAL RECORDS (OUTPATIENT)
Dept: OTHER | Age: 44
End: 2019-02-06

## 2019-02-07 ENCOUNTER — APPOINTMENT (OUTPATIENT)
Dept: OCCUPATIONAL MEDICINE | Facility: HOSPITAL | Age: 44
End: 2019-02-07
Attending: FAMILY MEDICINE
Payer: COMMERCIAL

## 2019-02-07 ENCOUNTER — TELEPHONE (OUTPATIENT)
Dept: INTERNAL MEDICINE CLINIC | Facility: CLINIC | Age: 44
End: 2019-02-07

## 2019-02-07 NOTE — TELEPHONE ENCOUNTER
Disability form - Attending physician statement received from patient. Doctor has filled out form, form is mailed to benefit administrators on patients behalf.     Copy to scan and copy in blue folder

## 2019-02-12 ENCOUNTER — HOSPITAL ENCOUNTER (OUTPATIENT)
Dept: SPEECH THERAPY | Facility: HOSPITAL | Age: 44
Setting detail: THERAPIES SERIES
Discharge: HOME | End: 2019-02-12
Attending: FAMILY MEDICINE
Payer: COMMERCIAL

## 2019-02-12 ENCOUNTER — APPOINTMENT (OUTPATIENT)
Dept: OCCUPATIONAL MEDICINE | Facility: HOSPITAL | Age: 44
End: 2019-02-12
Attending: Other

## 2019-02-12 PROCEDURE — 92507 TX SP LANG VOICE COMM INDIV: CPT

## 2019-02-12 NOTE — PROGRESS NOTES
Treatment #3 (PPO 90 visits; PN due 4/15/19)   Treatment Time: 60 minutes  Precautions: standard      Charges: 1 billed ([id number])  Pain: 0/10       Diagnosis: Cognitive change (R41.89);  Cerebrovascular accident (CVA), unspecified mechanism  (I63.9) skills; communication deficits are characterized by anomia, agraphia, and impaired reading comprehension. Plan: Continue speech-language therapy targeting use of compensatory strategies to facilitate receptive/expressive language skills.

## 2019-02-13 ENCOUNTER — LAB ENCOUNTER (OUTPATIENT)
Dept: LAB | Age: 44
End: 2019-02-13
Attending: FAMILY MEDICINE
Payer: COMMERCIAL

## 2019-02-13 ENCOUNTER — PRIOR ORIGINAL RECORDS (OUTPATIENT)
Dept: OTHER | Age: 44
End: 2019-02-13

## 2019-02-13 DIAGNOSIS — I25.10 CORONARY ARTERY DISEASE INVOLVING NATIVE CORONARY ARTERY OF NATIVE HEART WITHOUT ANGINA PECTORIS: ICD-10-CM

## 2019-02-13 DIAGNOSIS — Z95.810 CARDIAC DEFIBRILLATOR IN PLACE: ICD-10-CM

## 2019-02-13 LAB
INR BLD: 1.79 (ref 0.9–1.1)
INR: 1.79
PSA SERPL DL<=0.01 NG/ML-MCNC: 21.4 SECONDS (ref 12.4–14.7)

## 2019-02-13 PROCEDURE — 36415 COLL VENOUS BLD VENIPUNCTURE: CPT | Performed by: FAMILY MEDICINE

## 2019-02-13 PROCEDURE — 85610 PROTHROMBIN TIME: CPT | Performed by: FAMILY MEDICINE

## 2019-02-14 ENCOUNTER — HOSPITAL ENCOUNTER (OUTPATIENT)
Dept: OCCUPATIONAL MEDICINE | Facility: HOSPITAL | Age: 44
Setting detail: THERAPIES SERIES
Discharge: HOME | End: 2019-02-14
Attending: FAMILY MEDICINE
Payer: COMMERCIAL

## 2019-02-14 PROCEDURE — 97530 THERAPEUTIC ACTIVITIES: CPT

## 2019-02-14 NOTE — PROGRESS NOTES
Dx: CVA         Authorized # of Visits:  90 visit limit         Next MD visit: none scheduled  Fall Risk: standard         Precautions: n/a             Subjective: Pt stated, \"I am excited to go to this group tomorrow. \"    Objective:     COGNITION: Atten challenged when attention is divided. During cognitively demanding activities breaks required. Pt is highly motivated and would benefit from additional OT to improve the above deficits.   This would aim to maximize ability to complete daily home and commun the picture while attending to all details. Difficulty with orientation of lines and attention to details noted. Educated pt on methods to reduce visual stimuli. Pt in agreement.   Visual scanning activity worked on pt's ability to fine and connect visua

## 2019-02-14 NOTE — PROGRESS NOTES
Again, this patient is not managed by [facility], which is where it was routed. This should be reviewed by ordering physician and/or outside anticoagulation clinic.

## 2019-02-18 ENCOUNTER — HOSPITAL ENCOUNTER (OUTPATIENT)
Dept: SPEECH THERAPY | Facility: HOSPITAL | Age: 44
Setting detail: THERAPIES SERIES
Discharge: HOME | End: 2019-02-18
Attending: FAMILY MEDICINE
Payer: COMMERCIAL

## 2019-02-18 PROCEDURE — 92507 TX SP LANG VOICE COMM INDIV: CPT

## 2019-02-18 NOTE — PROGRESS NOTES
Treatment #4 (PPO 90 visits; PN due 4/15/19)   Treatment Time: 60 minutes  Precautions: standard      Charges: 1 billed ([id number])  Pain: 0/10       Diagnosis: Cognitive change (R41.89);  Cerebrovascular accident (CVA), unspecified mechanism  (I63.9) compensatory strategies and min verbal/visual cues. Assessment: Patient presents with cognitive-communication deficits.  Cognitive deficits are characterized by decreased execution function and attention/memory skills; communication deficits are characte

## 2019-02-19 ENCOUNTER — APPOINTMENT (OUTPATIENT)
Dept: OCCUPATIONAL MEDICINE | Facility: HOSPITAL | Age: 44
End: 2019-02-19
Attending: Other

## 2019-02-21 ENCOUNTER — APPOINTMENT (OUTPATIENT)
Dept: OCCUPATIONAL MEDICINE | Facility: HOSPITAL | Age: 44
End: 2019-02-21
Attending: FAMILY MEDICINE
Payer: COMMERCIAL

## 2019-02-26 ENCOUNTER — HOSPITAL ENCOUNTER (OUTPATIENT)
Dept: SPEECH THERAPY | Facility: HOSPITAL | Age: 44
Setting detail: THERAPIES SERIES
Discharge: HOME | End: 2019-02-26
Attending: FAMILY MEDICINE
Payer: COMMERCIAL

## 2019-02-26 ENCOUNTER — APPOINTMENT (OUTPATIENT)
Dept: OCCUPATIONAL MEDICINE | Facility: HOSPITAL | Age: 44
End: 2019-02-26
Attending: Other

## 2019-02-26 PROCEDURE — 92507 TX SP LANG VOICE COMM INDIV: CPT

## 2019-02-26 NOTE — PROGRESS NOTES
Treatment #5 (PPO 90 visits; PN due 4/15/19)   Treatment Time: 60 minutes  Precautions: standard      Charges: 1 billed ([id number])  Pain: 0/10       Diagnosis: Cognitive change (R41.89);  Cerebrovascular accident (CVA), unspecified mechanism  (I63.9) speech-language therapy targeting use of compensatory strategies to facilitate receptive/expressive language skills.

## 2019-02-27 ENCOUNTER — ANTI-COAG (OUTPATIENT)
Dept: CARDIOLOGY | Age: 44
End: 2019-02-27

## 2019-02-27 ENCOUNTER — APPOINTMENT (OUTPATIENT)
Dept: LAB | Age: 44
End: 2019-02-27
Attending: INTERNAL MEDICINE
Payer: COMMERCIAL

## 2019-02-27 ENCOUNTER — PRIOR ORIGINAL RECORDS (OUTPATIENT)
Dept: OTHER | Age: 44
End: 2019-02-27

## 2019-02-27 DIAGNOSIS — Z79.01 LONG TERM (CURRENT) USE OF ANTICOAGULANTS: ICD-10-CM

## 2019-02-27 DIAGNOSIS — I25.5 ISCHEMIC CARDIOMYOPATHY: ICD-10-CM

## 2019-02-27 LAB
INR BLD: 2.71 (ref 0.9–1.1)
INR: 2.7
PSA SERPL DL<=0.01 NG/ML-MCNC: 30.6 SECONDS (ref 12.5–14.7)

## 2019-02-27 PROCEDURE — 36415 COLL VENOUS BLD VENIPUNCTURE: CPT

## 2019-02-27 PROCEDURE — 85610 PROTHROMBIN TIME: CPT

## 2019-02-28 ENCOUNTER — APPOINTMENT (OUTPATIENT)
Dept: OCCUPATIONAL MEDICINE | Facility: HOSPITAL | Age: 44
End: 2019-02-28
Attending: FAMILY MEDICINE
Payer: COMMERCIAL

## 2019-02-28 ENCOUNTER — HOSPITAL ENCOUNTER (OUTPATIENT)
Dept: OCCUPATIONAL MEDICINE | Facility: HOSPITAL | Age: 44
Setting detail: THERAPIES SERIES
Discharge: HOME | End: 2019-02-28
Attending: FAMILY MEDICINE
Payer: COMMERCIAL

## 2019-02-28 VITALS
HEART RATE: 86 BPM | BODY MASS INDEX: 34.49 KG/M2 | WEIGHT: 202 LBS | HEIGHT: 64 IN | SYSTOLIC BLOOD PRESSURE: 98 MMHG | DIASTOLIC BLOOD PRESSURE: 58 MMHG

## 2019-02-28 VITALS
HEART RATE: 87 BPM | WEIGHT: 206 LBS | SYSTOLIC BLOOD PRESSURE: 98 MMHG | BODY MASS INDEX: 35.17 KG/M2 | DIASTOLIC BLOOD PRESSURE: 60 MMHG | RESPIRATION RATE: 20 BRPM | HEIGHT: 64 IN

## 2019-02-28 VITALS
HEIGHT: 64 IN | SYSTOLIC BLOOD PRESSURE: 102 MMHG | DIASTOLIC BLOOD PRESSURE: 78 MMHG | HEART RATE: 82 BPM | BODY MASS INDEX: 33.46 KG/M2 | WEIGHT: 196 LBS

## 2019-02-28 PROBLEM — I25.5 ISCHEMIC CARDIOMYOPATHY: Status: ACTIVE | Noted: 2019-02-28

## 2019-02-28 PROCEDURE — 97530 THERAPEUTIC ACTIVITIES: CPT

## 2019-02-28 NOTE — PROGRESS NOTES
Dx: CVA         Authorized # of Visits:  90 visit limit         Next MD visit: none scheduled  Fall Risk: standard         Precautions: n/a             Subjective: Pt stated, \"I'm upset with myself that I brought the wrong folder. \"    Objective:     COGN strategies. Pt is highly motivated and consistent with home program.  Recommend continued OT to improve the above deficits. This would aim to maximize performance during home and community activities.      Goals:   Short Term Goals:  Pt will demonstrate v memory, attention, processing and cognitive endurance. Discussed activities that can be impacted and other strategies that can be utilized. Pt asked questions and following explanation voiced understanding.   Transitioned to money management activity to p

## 2019-03-05 ENCOUNTER — HOSPITAL ENCOUNTER (OUTPATIENT)
Dept: SPEECH THERAPY | Facility: HOSPITAL | Age: 44
Setting detail: THERAPIES SERIES
Discharge: HOME | End: 2019-03-05
Attending: FAMILY MEDICINE
Payer: COMMERCIAL

## 2019-03-05 PROCEDURE — 92507 TX SP LANG VOICE COMM INDIV: CPT

## 2019-03-05 NOTE — PROGRESS NOTES
Treatment #6 (PPO 90 visits; PN due 4/15/19)   Treatment Time: 60 minutes  Precautions: standard      Charges: 1 billed ([id number])  Pain: 0/10       Diagnosis: Cognitive change (R41.89);  Cerebrovascular accident (CVA), unspecified mechanism  (I63.9)

## 2019-03-12 ENCOUNTER — HOSPITAL ENCOUNTER (OUTPATIENT)
Dept: SPEECH THERAPY | Facility: HOSPITAL | Age: 44
Setting detail: THERAPIES SERIES
Discharge: HOME | End: 2019-03-12
Attending: FAMILY MEDICINE
Payer: COMMERCIAL

## 2019-03-12 ENCOUNTER — HOSPITAL ENCOUNTER (OUTPATIENT)
Dept: OCCUPATIONAL MEDICINE | Facility: HOSPITAL | Age: 44
Setting detail: THERAPIES SERIES
Discharge: HOME | End: 2019-03-12
Attending: FAMILY MEDICINE
Payer: COMMERCIAL

## 2019-03-12 PROCEDURE — 97530 THERAPEUTIC ACTIVITIES: CPT

## 2019-03-12 PROCEDURE — 92507 TX SP LANG VOICE COMM INDIV: CPT

## 2019-03-12 PROCEDURE — 97112 NEUROMUSCULAR REEDUCATION: CPT

## 2019-03-12 NOTE — PROGRESS NOTES
Treatment #7 (PPO 90 visits; PN due 4/15/19)   Treatment Time: 60 minutes  Precautions: standard      Charges: 1 billed ([id number])  Pain: 0/10       Diagnosis: Cognitive change (R41.89);  Cerebrovascular accident (CVA), unspecified mechanism  (I63.9) receptive/expressive language skills.

## 2019-03-12 NOTE — PROGRESS NOTES
Dx: CVA         Authorized # of Visits:  90 visit limit         Next MD visit: none scheduled  Fall Risk: standard         Precautions: n/a             Subjective: Pt stated, \"I feel that I am ready to drive. \"    Objective:     COGNITION: Attention Span: writing down information, but does need reminders to integrate additional strategies. Cognitive impairments and vision changes are concerning for safety during community activities, driving, and IADLs.   Poor insight into safety impact of visual field loss Address vision and cognition and ability to complete IADLs. Also, work on progressing home programs.    Date: 2/14/2019 TX#: 2/ Date: 2/28/19               TX#: 3/   Date: 3/12/19             TX#: 4/ Date:               TX#: 5/ Date:               TX#: 6/ Additional visual scanning activity focused on quickly responding to visual information. Reminders given to turn head to locate items on the R side.    Peripheral field stimulation activity provided stimulation to R visual field loss and also aimed to impr

## 2019-03-19 ENCOUNTER — HOSPITAL ENCOUNTER (OUTPATIENT)
Dept: OCCUPATIONAL MEDICINE | Facility: HOSPITAL | Age: 44
Setting detail: THERAPIES SERIES
Discharge: HOME | End: 2019-03-19
Attending: FAMILY MEDICINE
Payer: COMMERCIAL

## 2019-03-19 ENCOUNTER — ANTI-COAG (OUTPATIENT)
Dept: CARDIOLOGY | Age: 44
End: 2019-03-19

## 2019-03-19 ENCOUNTER — HOSPITAL ENCOUNTER (OUTPATIENT)
Dept: SPEECH THERAPY | Facility: HOSPITAL | Age: 44
Setting detail: THERAPIES SERIES
Discharge: HOME | End: 2019-03-19
Attending: FAMILY MEDICINE
Payer: COMMERCIAL

## 2019-03-19 ENCOUNTER — LAB ENCOUNTER (OUTPATIENT)
Dept: LAB | Age: 44
End: 2019-03-19
Attending: FAMILY MEDICINE
Payer: COMMERCIAL

## 2019-03-19 DIAGNOSIS — I25.5 ISCHEMIC CARDIOMYOPATHY: ICD-10-CM

## 2019-03-19 DIAGNOSIS — Z95.810 CARDIAC DEFIBRILLATOR IN PLACE: ICD-10-CM

## 2019-03-19 DIAGNOSIS — E78.00 PURE HYPERCHOLESTEROLEMIA: ICD-10-CM

## 2019-03-19 DIAGNOSIS — I25.10 CORONARY ARTERY DISEASE INVOLVING NATIVE CORONARY ARTERY OF NATIVE HEART WITHOUT ANGINA PECTORIS: ICD-10-CM

## 2019-03-19 DIAGNOSIS — I50.22 CHRONIC SYSTOLIC HEART FAILURE (HCC): ICD-10-CM

## 2019-03-19 DIAGNOSIS — E55.9 VITAMIN D DEFICIENCY: ICD-10-CM

## 2019-03-19 LAB
INR BLD: 2.56 (ref 0.9–1.1)
INR PPP: 2.56
PSA SERPL DL<=0.01 NG/ML-MCNC: 29.2 SECONDS (ref 12.5–14.7)

## 2019-03-19 PROCEDURE — 97112 NEUROMUSCULAR REEDUCATION: CPT

## 2019-03-19 PROCEDURE — 85610 PROTHROMBIN TIME: CPT | Performed by: FAMILY MEDICINE

## 2019-03-19 PROCEDURE — 97530 THERAPEUTIC ACTIVITIES: CPT

## 2019-03-19 PROCEDURE — 92507 TX SP LANG VOICE COMM INDIV: CPT

## 2019-03-19 PROCEDURE — 36415 COLL VENOUS BLD VENIPUNCTURE: CPT | Performed by: FAMILY MEDICINE

## 2019-03-19 NOTE — PROGRESS NOTES
Treatment #8 (PPO 90 visits; PN due 4/15/19)   Treatment Time: 60 minutes  Precautions: standard      Charges: 1 billed ([id number])  Pain: 0/10       Diagnosis: Cognitive change (R41.89);  Cerebrovascular accident (CVA), unspecified mechanism  (I63.9) language skills. NEW GOAL:   STG 6: Patient will independently complete written description (3-5 sentences in length) with no more than 2-3 errors to facilitate spelling skills (3 months).   Patient completed written picture description task (3 sentences

## 2019-03-19 NOTE — PROGRESS NOTES
Dx: CVA         Authorized # of Visits:  90 visit limit         Next MD visit: none scheduled  Fall Risk: standard         Precautions: n/a             Subjective: Pt stated, \"I just want this all to get better. \"    Objective:     COGNITION: Attention Sp of writing down information, but does need reminders to integrate additional strategies. Cognitive impairments and vision changes are concerning for safety during community activities, driving, and IADLs.   Poor insight into safety impact of visual field l Address vision and cognition and ability to complete IADLs. Also, work on progressing home programs.    Date: 2/14/2019 TX#: 2/ Date: 2/28/19               TX#: 3/   Date: 3/12/19             TX#: 4/ Date:               TX#: 5/ Date:               TX#: 6/ worked on awareness of possible hazards pt may encounter d/t visual field loss. Pt was given a variety of situations and needed to identify both hazards and solutions. Extra cuing to identify other obstacles and possible solutions.   Additional time neede

## 2019-03-26 ENCOUNTER — APPOINTMENT (OUTPATIENT)
Dept: SPEECH THERAPY | Facility: HOSPITAL | Age: 44
End: 2019-03-26
Payer: COMMERCIAL

## 2019-03-26 ENCOUNTER — APPOINTMENT (OUTPATIENT)
Dept: OCCUPATIONAL MEDICINE | Facility: HOSPITAL | Age: 44
End: 2019-03-26
Attending: FAMILY MEDICINE
Payer: COMMERCIAL

## 2019-04-01 RX ORDER — PAROXETINE HYDROCHLORIDE HEMIHYDRATE 12.5 MG/1
TABLET, FILM COATED, EXTENDED RELEASE ORAL
COMMUNITY
Start: 2018-10-02

## 2019-04-01 RX ORDER — CARVEDILOL 6.25 MG/1
TABLET ORAL
COMMUNITY
Start: 2018-10-02 | End: 2019-08-16 | Stop reason: SDUPTHER

## 2019-04-01 RX ORDER — LISINOPRIL 2.5 MG/1
TABLET ORAL
COMMUNITY
Start: 2018-10-02 | End: 2019-08-07 | Stop reason: DRUGHIGH

## 2019-04-01 RX ORDER — WARFARIN SODIUM 5 MG/1
TABLET ORAL
COMMUNITY
Start: 2019-01-30 | End: 2019-08-07 | Stop reason: CLARIF

## 2019-04-01 RX ORDER — ATORVASTATIN CALCIUM 80 MG/1
TABLET, FILM COATED ORAL
COMMUNITY
Start: 2018-10-02 | End: 2019-06-12 | Stop reason: SDUPTHER

## 2019-04-02 ENCOUNTER — APPOINTMENT (OUTPATIENT)
Dept: SPEECH THERAPY | Facility: HOSPITAL | Age: 44
End: 2019-04-02

## 2019-04-09 ENCOUNTER — APPOINTMENT (OUTPATIENT)
Dept: SPEECH THERAPY | Facility: HOSPITAL | Age: 44
End: 2019-04-09

## 2019-04-25 ENCOUNTER — TELEPHONE (OUTPATIENT)
Dept: INTERNAL MEDICINE CLINIC | Facility: CLINIC | Age: 44
End: 2019-04-25

## 2019-04-25 RX ORDER — PAROXETINE HYDROCHLORIDE 20 MG/1
20 TABLET, FILM COATED ORAL DAILY
Qty: 90 TABLET | Refills: 0 | Status: SHIPPED | OUTPATIENT
Start: 2019-04-25 | End: 2019-07-24

## 2019-04-25 NOTE — TELEPHONE ENCOUNTER
Patient calling in, stated she has new insurance [facility]) and the PAROXETINE HCL ER 25 MG Oral Tablet 24 Hr, is NOT covered. Pt seeking an alternative med.     Preferred Pharmacy:  [address] [place] [address] ( on file)

## 2019-04-27 ENCOUNTER — LAB ENCOUNTER (OUTPATIENT)
Dept: LAB | Age: 44
End: 2019-04-27
Attending: INTERNAL MEDICINE
Payer: COMMERCIAL

## 2019-04-27 DIAGNOSIS — E78.00 PURE HYPERCHOLESTEROLEMIA: ICD-10-CM

## 2019-04-27 DIAGNOSIS — Z79.01 LONG TERM (CURRENT) USE OF ANTICOAGULANTS: ICD-10-CM

## 2019-04-27 DIAGNOSIS — I50.22 CHRONIC SYSTOLIC HEART FAILURE (HCC): ICD-10-CM

## 2019-04-27 DIAGNOSIS — E55.9 VITAMIN D DEFICIENCY: ICD-10-CM

## 2019-04-27 DIAGNOSIS — I25.10 CORONARY ARTERY DISEASE INVOLVING NATIVE CORONARY ARTERY OF NATIVE HEART WITHOUT ANGINA PECTORIS: ICD-10-CM

## 2019-04-27 DIAGNOSIS — Z95.810 CARDIAC DEFIBRILLATOR IN PLACE: ICD-10-CM

## 2019-04-27 DIAGNOSIS — I25.5 ISCHEMIC CARDIOMYOPATHY: ICD-10-CM

## 2019-04-27 LAB
ABSOLUTE IMMATURE GRANULOCYTES (OFFPRE24): NORMAL
ALBUMIN SERPL-MCNC: 3.2 G/DL
ALBUMIN/GLOB SERPL: NORMAL {RATIO}
ALP SERPL-CCNC: 124 U/L
ALT SERPL-CCNC: 29 U/L
ANION GAP SERPL CALC-SCNC: NORMAL MMOL/L
AST SERPL-CCNC: 18 U/L
BASO+EOS+MONOS # BLD: NORMAL 10*3/UL
BASO+EOS+MONOS NFR BLD: NORMAL %
BASOPHILS # BLD: NORMAL 10*3/UL
BASOPHILS NFR BLD: NORMAL %
BILIRUB SERPL-MCNC: 0.2 MG/DL
BNP-NT-PRO: 487
BUN SERPL-MCNC: 15 MG/DL
BUN/CREAT SERPL: NORMAL
CALCIUM SERPL-MCNC: 9.1 MG/DL
CHLORIDE SERPL-SCNC: 112 MMOL/L
CHOLEST SERPL-MCNC: 85 MG/DL
CHOLEST/HDLC SERPL: 40 {RATIO}
CO2 SERPL-SCNC: NORMAL MMOL/L
CREAT SERPL-MCNC: 1 MG/DL
DIFFERENTIAL METHOD BLD: NORMAL
EOSINOPHIL # BLD: NORMAL 10*3/UL
EOSINOPHIL NFR BLD: NORMAL %
ERYTHROCYTE [DISTWIDTH] IN BLOOD: NORMAL %
GLOBULIN SER-MCNC: 4 G/DL
GLUCOSE SERPL-MCNC: 106 MG/DL
HCT VFR BLD CALC: 40.2 %
HDLC SERPL-MCNC: 40 MG/DL
HGB BLD-MCNC: 12.3 G/DL
IMMATURE GRANULOCYTES (OFFPRE25): NORMAL
INR PPP: 3.75
LDLC SERPL CALC-MCNC: 18 MG/DL
LENGTH OF FAST TIME PATIENT: NORMAL H
LENGTH OF FAST TIME PATIENT: NORMAL H
LYMPHOCYTES # BLD: NORMAL 10*3/UL
LYMPHOCYTES NFR BLD: NORMAL %
Lab: 18.8
MCH RBC QN AUTO: 25 PG
MCHC RBC AUTO-ENTMCNC: 30.6 G/DL
MCV RBC AUTO: 81.7 FL
MONOCYTES # BLD: NORMAL 10*3/UL
MONOCYTES NFR BLD: NORMAL %
MPV (OFFPRE2): NORMAL
NEUTROPHILS # BLD: NORMAL 10*3/UL
NEUTROPHILS NFR BLD: NORMAL %
NONHDLC SERPL-MCNC: 45 MG/DL
NRBC BLD MANUAL-RTO: NORMAL %
PLAT MORPH BLD: NORMAL
PLATELET # BLD: 290 10*3/UL
POTASSIUM SERPL-SCNC: 4.5 MMOL/L
PROT SERPL-MCNC: 7.2 G/DL
RBC # BLD: 4.92 10*6/UL
RBC MORPH BLD: NORMAL
SODIUM SERPL-SCNC: 142 MMOL/L
TRIGL SERPL-MCNC: 133 MG/DL
TSH SERPL DL<=0.005 MIU/L-ACNC: 3.63 M[IU]/L
VLDLC SERPL CALC-MCNC: 27 MG/DL
WBC # BLD: 8.2 10*3/UL
WBC MORPH BLD: NORMAL

## 2019-04-27 PROCEDURE — 83880 ASSAY OF NATRIURETIC PEPTIDE: CPT

## 2019-04-27 PROCEDURE — 85610 PROTHROMBIN TIME: CPT

## 2019-04-27 PROCEDURE — 36415 COLL VENOUS BLD VENIPUNCTURE: CPT

## 2019-04-27 PROCEDURE — 84443 ASSAY THYROID STIM HORMONE: CPT

## 2019-04-27 PROCEDURE — 80053 COMPREHEN METABOLIC PANEL: CPT

## 2019-04-27 PROCEDURE — 85025 COMPLETE CBC W/AUTO DIFF WBC: CPT

## 2019-04-27 PROCEDURE — 80061 LIPID PANEL: CPT

## 2019-04-27 PROCEDURE — 82306 VITAMIN D 25 HYDROXY: CPT

## 2019-04-29 ENCOUNTER — CLINICAL ABSTRACT (OUTPATIENT)
Dept: CARDIOLOGY | Age: 44
End: 2019-04-29

## 2019-04-29 ENCOUNTER — ANTI-COAG (OUTPATIENT)
Dept: CARDIOLOGY | Age: 44
End: 2019-04-29

## 2019-04-29 DIAGNOSIS — I25.5 ISCHEMIC CARDIOMYOPATHY: ICD-10-CM

## 2019-05-14 ENCOUNTER — HOSPITAL ENCOUNTER (OUTPATIENT)
Dept: CV DIAGNOSTICS | Age: 44
Discharge: HOME | End: 2019-05-14
Attending: INTERNAL MEDICINE
Payer: COMMERCIAL

## 2019-05-14 ENCOUNTER — ANTI-COAG (OUTPATIENT)
Dept: CARDIOLOGY | Age: 44
End: 2019-05-14

## 2019-05-14 ENCOUNTER — APPOINTMENT (OUTPATIENT)
Dept: LAB | Age: 44
End: 2019-05-14
Attending: INTERNAL MEDICINE
Payer: COMMERCIAL

## 2019-05-14 DIAGNOSIS — I25.5 ISCHEMIC CARDIOMYOPATHY: ICD-10-CM

## 2019-05-14 DIAGNOSIS — I25.5 CARDIOMYOPATHY, ISCHEMIC: ICD-10-CM

## 2019-05-14 DIAGNOSIS — Z79.01 LONG TERM (CURRENT) USE OF ANTICOAGULANTS: ICD-10-CM

## 2019-05-14 LAB — INR PPP: 3.01

## 2019-05-14 PROCEDURE — 93306 TTE W/DOPPLER COMPLETE: CPT | Performed by: INTERNAL MEDICINE

## 2019-05-14 PROCEDURE — 36415 COLL VENOUS BLD VENIPUNCTURE: CPT

## 2019-05-14 PROCEDURE — 85610 PROTHROMBIN TIME: CPT

## 2019-05-15 ENCOUNTER — TELEPHONE (OUTPATIENT)
Dept: CARDIOLOGY | Age: 44
End: 2019-05-15

## 2019-05-22 ENCOUNTER — TELEPHONE (OUTPATIENT)
Dept: CARDIOLOGY | Age: 44
End: 2019-05-22

## 2019-05-22 DIAGNOSIS — I25.5 ISCHEMIC CARDIOMYOPATHY: Primary | ICD-10-CM

## 2019-05-24 ENCOUNTER — TELEPHONE (OUTPATIENT)
Dept: CARDIOLOGY | Age: 44
End: 2019-05-24

## 2019-05-30 ENCOUNTER — HOSPITAL ENCOUNTER (OUTPATIENT)
Dept: CV DIAGNOSTICS | Facility: HOSPITAL | Age: 44
Discharge: HOME | End: 2019-05-30
Attending: INTERNAL MEDICINE
Payer: COMMERCIAL

## 2019-05-30 DIAGNOSIS — I50.22 CHRONIC SYSTOLIC HEART FAILURE (HCC): ICD-10-CM

## 2019-05-30 DIAGNOSIS — I25.5 ISCHEMIC CARDIOMYOPATHY: ICD-10-CM

## 2019-05-30 PROCEDURE — 93018 CV STRESS TEST I&R ONLY: CPT | Performed by: INTERNAL MEDICINE

## 2019-05-30 PROCEDURE — 78452 HT MUSCLE IMAGE SPECT MULT: CPT | Performed by: INTERNAL MEDICINE

## 2019-05-30 PROCEDURE — 93017 CV STRESS TEST TRACING ONLY: CPT | Performed by: INTERNAL MEDICINE

## 2019-06-06 ENCOUNTER — TELEPHONE (OUTPATIENT)
Dept: CARDIOLOGY | Age: 44
End: 2019-06-06

## 2019-06-10 DIAGNOSIS — I25.5 ISCHEMIC CARDIOMYOPATHY: ICD-10-CM

## 2019-06-11 ENCOUNTER — ANTI-COAG (OUTPATIENT)
Dept: CARDIOLOGY | Age: 44
End: 2019-06-11

## 2019-06-11 DIAGNOSIS — I25.5 ISCHEMIC CARDIOMYOPATHY: ICD-10-CM

## 2019-06-12 ENCOUNTER — ANTI-COAG (OUTPATIENT)
Dept: CARDIOLOGY | Age: 44
End: 2019-06-12

## 2019-06-12 ENCOUNTER — TELEPHONE (OUTPATIENT)
Dept: CARDIOLOGY | Age: 44
End: 2019-06-12

## 2019-06-12 ENCOUNTER — OFFICE VISIT (OUTPATIENT)
Dept: CARDIOLOGY | Age: 44
End: 2019-06-12

## 2019-06-12 ENCOUNTER — TELEPHONE (OUTPATIENT)
Dept: INTERNAL MEDICINE CLINIC | Facility: CLINIC | Age: 44
End: 2019-06-12

## 2019-06-12 VITALS
HEART RATE: 80 BPM | DIASTOLIC BLOOD PRESSURE: 64 MMHG | SYSTOLIC BLOOD PRESSURE: 120 MMHG | WEIGHT: 217 LBS | HEIGHT: 63 IN | BODY MASS INDEX: 38.45 KG/M2

## 2019-06-12 DIAGNOSIS — I25.10 CORONARY ARTERY DISEASE INVOLVING NATIVE CORONARY ARTERY OF NATIVE HEART WITHOUT ANGINA PECTORIS: Primary | ICD-10-CM

## 2019-06-12 DIAGNOSIS — Z79.01 ANTICOAGULATED ON COUMADIN: ICD-10-CM

## 2019-06-12 DIAGNOSIS — Z95.5 S/P PRIMARY ANGIOPLASTY WITH CORONARY STENT: ICD-10-CM

## 2019-06-12 DIAGNOSIS — I25.5 ISCHEMIC CARDIOMYOPATHY: ICD-10-CM

## 2019-06-12 DIAGNOSIS — I25.10 CORONARY ARTERY DISEASE INVOLVING NATIVE CORONARY ARTERY OF NATIVE HEART WITHOUT ANGINA PECTORIS: ICD-10-CM

## 2019-06-12 DIAGNOSIS — I50.22 HEART FAILURE, LEFT SYSTOLIC, CHRONIC (CMD): ICD-10-CM

## 2019-06-12 DIAGNOSIS — I25.5 ISCHEMIC CARDIOMYOPATHY: Primary | ICD-10-CM

## 2019-06-12 PROCEDURE — 99214 OFFICE O/P EST MOD 30 MIN: CPT | Performed by: INTERNAL MEDICINE

## 2019-06-12 RX ORDER — ATORVASTATIN CALCIUM 40 MG/1
40 TABLET, FILM COATED ORAL DAILY
Qty: 90 TABLET | Refills: 1 | Status: SHIPPED | OUTPATIENT
Start: 2019-06-12 | End: 2019-12-18 | Stop reason: SDUPTHER

## 2019-06-12 SDOH — HEALTH STABILITY: PHYSICAL HEALTH: ON AVERAGE, HOW MANY MINUTES DO YOU ENGAGE IN EXERCISE AT THIS LEVEL?: 0 MIN

## 2019-06-12 SDOH — HEALTH STABILITY: PHYSICAL HEALTH: ON AVERAGE, HOW MANY DAYS PER WEEK DO YOU ENGAGE IN MODERATE TO STRENUOUS EXERCISE (LIKE A BRISK WALK)?: 0 DAYS

## 2019-06-12 ASSESSMENT — ENCOUNTER SYMPTOMS
HEMOPTYSIS: 0
HEMATOCHEZIA: 0
WEIGHT GAIN: 0
ALLERGIC/IMMUNOLOGIC COMMENTS: NO NEW FOOD ALLERGIES
BRUISES/BLEEDS EASILY: 0
CHILLS: 0
COUGH: 0
FEVER: 0
WEIGHT LOSS: 0
SUSPICIOUS LESIONS: 0

## 2019-06-12 NOTE — TELEPHONE ENCOUNTER
CARDIO REFERRAL - PT AT [facility]   Received:  Today   Message Contents   Fariba Mcguire Emg 08 Clinical Staff   Cc: [facility] Referral Pool   Phone Number: [phone]             .Reason for the order/referral: STRESS TEST F/U   PCP:  ALISE Tanner

## 2019-06-17 ENCOUNTER — TELEPHONE (OUTPATIENT)
Dept: CARDIOLOGY | Age: 44
End: 2019-06-17

## 2019-06-18 RX ORDER — CLOPIDOGREL BISULFATE 75 MG/1
75 TABLET ORAL DAILY
Qty: 90 TABLET | Refills: 3 | Status: SHIPPED | OUTPATIENT
Start: 2019-06-18 | End: 2020-07-20

## 2019-06-28 ENCOUNTER — TELEPHONE (OUTPATIENT)
Dept: INTERNAL MEDICINE CLINIC | Facility: CLINIC | Age: 44
End: 2019-06-28

## 2019-06-28 DIAGNOSIS — L40.9 PSORIASIS: Primary | ICD-10-CM

## 2019-06-28 NOTE — TELEPHONE ENCOUNTER
Referral request   Received: 4 days ago   Message Contents   Josee Hagan Emg 08 Clinical Staff Cc: [facility]   Phone Number: [phone]             Patient Name: Lennox Sandhoff   : 1975   Reason for the order/refer

## 2019-07-09 ENCOUNTER — TELEPHONE (OUTPATIENT)
Dept: CARDIOLOGY | Age: 44
End: 2019-07-09

## 2019-07-26 RX ORDER — PAROXETINE HYDROCHLORIDE 20 MG/1
TABLET, FILM COATED ORAL
Qty: 90 TABLET | Refills: 0 | Status: SHIPPED | OUTPATIENT
Start: 2019-07-26 | End: 2019-10-24

## 2019-07-26 NOTE — TELEPHONE ENCOUNTER
Paroxetine HCl 20 MG  Last OV relevant to medication: 1-7-19  Last refill date: 4-25-19  #/refills: 0  When pt was asked to return for OV: none  Upcoming appt/reason: none  Recent labs: none

## 2019-08-07 ENCOUNTER — OFFICE VISIT (OUTPATIENT)
Dept: CARDIOLOGY | Age: 44
End: 2019-08-07

## 2019-08-07 DIAGNOSIS — E55.9 VITAMIN D DEFICIENCY: ICD-10-CM

## 2019-08-07 DIAGNOSIS — I25.5 ISCHEMIC CARDIOMYOPATHY: ICD-10-CM

## 2019-08-07 DIAGNOSIS — E78.00 PURE HYPERCHOLESTEROLEMIA: ICD-10-CM

## 2019-08-07 DIAGNOSIS — I50.22 HEART FAILURE, LEFT SYSTOLIC, CHRONIC (CMD): Primary | ICD-10-CM

## 2019-08-07 PROCEDURE — 99215 OFFICE O/P EST HI 40 MIN: CPT | Performed by: INTERNAL MEDICINE

## 2019-08-07 RX ORDER — LISINOPRIL 10 MG/1
5 TABLET ORAL DAILY
Qty: 90 TABLET | Refills: 3 | Status: SHIPPED | OUTPATIENT
Start: 2019-08-07 | End: 2020-08-05 | Stop reason: DRUGHIGH

## 2019-08-07 RX ORDER — CHOLECALCIFEROL (VITAMIN D3) 125 MCG
4000 CAPSULE ORAL DAILY
COMMUNITY

## 2019-08-07 ASSESSMENT — ENCOUNTER SYMPTOMS
COUGH: 0
SUSPICIOUS LESIONS: 0
HEMOPTYSIS: 0
CHILLS: 0
FEVER: 0
ALLERGIC/IMMUNOLOGIC COMMENTS: NO NEW FOOD ALLERGIES
WEIGHT LOSS: 0
HEMATOCHEZIA: 0
WEIGHT GAIN: 0
BRUISES/BLEEDS EASILY: 0

## 2019-08-07 ASSESSMENT — PATIENT HEALTH QUESTIONNAIRE - PHQ9
1. LITTLE INTEREST OR PLEASURE IN DOING THINGS: NOT AT ALL
2. FEELING DOWN, DEPRESSED OR HOPELESS: NOT AT ALL
SUM OF ALL RESPONSES TO PHQ9 QUESTIONS 1 AND 2: 0
SUM OF ALL RESPONSES TO PHQ9 QUESTIONS 1 AND 2: 0

## 2019-08-07 ASSESSMENT — NEW YORK HEART ASSOCIATION (NYHA) CLASSIFICATION: NYHA FUNCTIONAL CLASS: II

## 2019-08-10 RX ORDER — PAROXETINE HYDROCHLORIDE 20 MG/1
TABLET, FILM COATED ORAL
Qty: 90 TABLET | Refills: 0 | OUTPATIENT
Start: 2019-08-10

## 2019-08-10 NOTE — TELEPHONE ENCOUNTER
Patient picked up 90 day supply from pharmacy on 7/26/2019    Not due for refills until  October 2019

## 2019-08-16 RX ORDER — CARVEDILOL 6.25 MG/1
6.25 TABLET ORAL 2 TIMES DAILY
Qty: 180 TABLET | Refills: 3 | Status: SHIPPED | OUTPATIENT
Start: 2019-08-16 | End: 2020-04-09 | Stop reason: DRUGHIGH

## 2019-08-19 RX ORDER — CARVEDILOL 6.25 MG/1
6.25 TABLET ORAL 2 TIMES DAILY
Qty: 180 TABLET | Refills: 3 | Status: CANCELLED | OUTPATIENT
Start: 2019-08-19

## 2019-09-06 ENCOUNTER — TELEPHONE (OUTPATIENT)
Dept: CARDIOLOGY | Age: 44
End: 2019-09-06

## 2019-09-06 LAB
ABSOLUTE IMMATURE GRANULOCYTES (OFFPRE24): NORMAL
ANION GAP SERPL CALC-SCNC: NORMAL MMOL/L
BASO+EOS+MONOS # BLD: NORMAL 10*3/UL
BASO+EOS+MONOS NFR BLD: NORMAL %
BASOPHILS # BLD: NORMAL 10*3/UL
BASOPHILS NFR BLD: NORMAL %
BUN SERPL-MCNC: 14 MG/DL
BUN/CREAT SERPL: NORMAL
CALCIUM SERPL-MCNC: 8.9 MG/DL
CHLORIDE SERPL-SCNC: 110 MMOL/L
CHOLEST SERPL-MCNC: 81 MG/DL
CHOLEST/HDLC SERPL: 2.5 {RATIO}
CO2 SERPL-SCNC: NORMAL MMOL/L
CREAT SERPL-MCNC: 0.78 MG/DL
DIFFERENTIAL METHOD BLD: NORMAL
EOSINOPHIL # BLD: NORMAL 10*3/UL
EOSINOPHIL NFR BLD: NORMAL %
ERYTHROCYTE [DISTWIDTH] IN BLOOD: NORMAL %
GLUCOSE SERPL-MCNC: 98 MG/DL
HCT VFR BLD CALC: 35.4 %
HDLC SERPL-MCNC: 32 MG/DL
HGB BLD-MCNC: 11.5 G/DL
IMMATURE GRANULOCYTES (OFFPRE25): NORMAL
LDLC SERPL CALC-MCNC: 33 MG/DL
LENGTH OF FAST TIME PATIENT: NORMAL H
LENGTH OF FAST TIME PATIENT: NORMAL H
LYMPHOCYTES # BLD: NORMAL 10*3/UL
LYMPHOCYTES NFR BLD: NORMAL %
MCH RBC QN AUTO: NORMAL PG
MCHC RBC AUTO-ENTMCNC: NORMAL G/DL
MCV RBC AUTO: NORMAL FL
MONOCYTES # BLD: NORMAL 10*3/UL
MONOCYTES NFR BLD: NORMAL %
MPV (OFFPRE2): NORMAL
NEUTROPHILS # BLD: NORMAL 10*3/UL
NEUTROPHILS NFR BLD: NORMAL %
NONHDLC SERPL-MCNC: NORMAL MG/DL
NRBC BLD MANUAL-RTO: NORMAL %
PLAT MORPH BLD: NORMAL
PLATELET # BLD: 179 10*3/UL
POTASSIUM SERPL-SCNC: 3.9 MMOL/L
RBC # BLD: 4.08 10*6/UL
RBC MORPH BLD: NORMAL
SODIUM SERPL-SCNC: 141 MMOL/L
TRIGL SERPL-MCNC: 79 MG/DL
VLDLC SERPL CALC-MCNC: 16 MG/DL
WBC # BLD: 9.34 10*3/UL
WBC MORPH BLD: NORMAL

## 2019-09-09 ENCOUNTER — TELEPHONE (OUTPATIENT)
Dept: CARDIOLOGY | Age: 44
End: 2019-09-09

## 2019-09-12 ENCOUNTER — TELEPHONE (OUTPATIENT)
Dept: INTERNAL MEDICINE CLINIC | Facility: CLINIC | Age: 44
End: 2019-09-12

## 2019-09-13 ENCOUNTER — TELEPHONE (OUTPATIENT)
Dept: CARDIOLOGY | Age: 44
End: 2019-09-13

## 2019-09-13 ENCOUNTER — TELEPHONE (OUTPATIENT)
Dept: NEUROLOGY | Facility: CLINIC | Age: 44
End: 2019-09-13

## 2019-09-13 NOTE — TELEPHONE ENCOUNTER
Mom calling back nurse; per Rean Beth we have received [facility] records and she is to follow up with Dr Isaac Saez after seeing neurologist.

## 2019-09-13 NOTE — TELEPHONE ENCOUNTER
Patient's mother states patient had 5 seizures, one of which was [place] on 9/4 and was seen at [facility]. Patient is now on seizure medication that is making her very tired.  Patient's mother states would like her daughter to see

## 2019-09-16 ENCOUNTER — HOSPITAL ENCOUNTER (OUTPATIENT)
Dept: CV DIAGNOSTICS | Age: 44
Discharge: HOME | End: 2019-09-16
Attending: INTERNAL MEDICINE
Payer: COMMERCIAL

## 2019-09-16 ENCOUNTER — TELEPHONE (OUTPATIENT)
Dept: INTERNAL MEDICINE CLINIC | Facility: CLINIC | Age: 44
End: 2019-09-16

## 2019-09-16 DIAGNOSIS — R56.9 SEIZURE (HCC): Primary | ICD-10-CM

## 2019-09-16 DIAGNOSIS — I50.9 HEART FAILURE, UNSPECIFIED HF CHRONICITY, UNSPECIFIED HEART FAILURE TYPE (HCC): ICD-10-CM

## 2019-09-16 DIAGNOSIS — I25.10 CORONARY ARTERY DISEASE INVOLVING NATIVE CORONARY ARTERY OF NATIVE HEART WITHOUT ANGINA PECTORIS: ICD-10-CM

## 2019-09-16 DIAGNOSIS — I63.9 CEREBROVASCULAR ACCIDENT (CVA), UNSPECIFIED MECHANISM (HCC): ICD-10-CM

## 2019-09-16 LAB — LV EF: NORMAL %

## 2019-09-16 PROCEDURE — 93306 TTE W/DOPPLER COMPLETE: CPT | Performed by: INTERNAL MEDICINE

## 2019-09-16 NOTE — TELEPHONE ENCOUNTER
Called patient's mother (ok per HIPPA) and offered 9/20/2019 appointment. Patient's mother accepted and patient scheduled for 9/20/2019 3 pm appointment. Information given to PSRs.

## 2019-09-16 NOTE — TELEPHONE ENCOUNTER
Referral Request 1 of 2   Received:  Today   Message Contents   Erica Colt Emg 08 Clinical Staff Cc: [facility] Referral Pool   Phone Number: [phone]             .Reason for the order/referral:ER Follow Up   PCP: Chance Crawford   Refer to Provider

## 2019-09-16 NOTE — TELEPHONE ENCOUNTER
Medical records from [facility] placed in Dr [place] folder for review.

## 2019-09-19 ENCOUNTER — TELEPHONE (OUTPATIENT)
Dept: CARDIOLOGY | Age: 44
End: 2019-09-19

## 2019-09-20 ENCOUNTER — OFFICE VISIT (OUTPATIENT)
Dept: NEUROLOGY | Facility: CLINIC | Age: 44
End: 2019-09-20

## 2019-09-20 VITALS — SYSTOLIC BLOOD PRESSURE: 110 MMHG | DIASTOLIC BLOOD PRESSURE: 64 MMHG | HEART RATE: 70 BPM | RESPIRATION RATE: 16 BRPM

## 2019-09-20 DIAGNOSIS — R56.9 SEIZURES (HCC): ICD-10-CM

## 2019-09-20 DIAGNOSIS — I63.9 CEREBROVASCULAR ACCIDENT (CVA), UNSPECIFIED MECHANISM (HCC): Primary | ICD-10-CM

## 2019-09-20 PROCEDURE — 99214 OFFICE O/P EST MOD 30 MIN: CPT | Performed by: OTHER

## 2019-09-20 RX ORDER — CHOLECALCIFEROL (VITAMIN D3) 50 MCG
4000 TABLET ORAL DAILY
COMMUNITY

## 2019-09-20 RX ORDER — LEVETIRACETAM 250 MG/1
TABLET ORAL
Qty: 90 TABLET | Refills: 0 | Status: SHIPPED | OUTPATIENT
Start: 2019-09-20 | End: 2019-10-18

## 2019-09-20 NOTE — PROGRESS NOTES
Neurology H&P    [facility] Patient Status:  No patient class for patient encounter    1975 MRN [id number]   Location [address] [facility], [address] [place] [address] Drive, [facility] Attending No att. providers found   [facility] Day # 0 PCP Yusuf Escalante MD daily. Disp:  Rfl:    aspirin 81 MG Oral Tab Take 81 mg by mouth daily.  Disp:  Rfl:    Halobetasol Propionate 0.05 % External Ointment Apply a thin layer to the affected areas each morning Disp: 50 g Rfl: 2   Clobetasol Propionate (TEMOVATE) 0.05 % Externa Procedure Laterality Date   • IR STENT      7/2018   • OTHER      Impalla insertion in groin to increase blood flow.  implanted in groin       SocHx:  Social History    Tobacco Use      Smoking status: Former Smoker        Packs/day: 0.00        Years: 0. intact  LE: intact to light touch, pinprick intact    COORDINATION:  No dysmetria, or intention tremors    REFLEXES: 2+ at biceps, 2+ brachioradialis, 2+ at patella, 2+ at the ankles    GAIT: normal stance, normal toe gait and unsteady heal gait, tandem is

## 2019-09-24 ENCOUNTER — TELEPHONE (OUTPATIENT)
Dept: NEUROLOGY | Facility: CLINIC | Age: 44
End: 2019-09-24

## 2019-09-25 ENCOUNTER — TELEPHONE (OUTPATIENT)
Dept: NEUROLOGY | Facility: CLINIC | Age: 44
End: 2019-09-25

## 2019-09-25 NOTE — TELEPHONE ENCOUNTER
Brief Neurology Note:  I received OSH records from [facility]. CTH L parietal and occipital lobe encephalomalacia form previous infarct. MRI DWI changes small in the L parietal areas along border of previous infarct.  Possibly this was due to sei

## 2019-10-02 ENCOUNTER — TELEPHONE (OUTPATIENT)
Dept: NEUROLOGY | Facility: CLINIC | Age: 44
End: 2019-10-02

## 2019-10-07 ENCOUNTER — TELEPHONE (OUTPATIENT)
Dept: CARDIOLOGY | Age: 44
End: 2019-10-07

## 2019-10-10 PROCEDURE — 88305 TISSUE EXAM BY PATHOLOGIST: CPT

## 2019-10-11 ENCOUNTER — APPOINTMENT (OUTPATIENT)
Dept: LAB | Facility: HOSPITAL | Age: 44
End: 2019-10-11
Attending: DERMATOLOGY
Payer: COMMERCIAL

## 2019-10-11 DIAGNOSIS — D48.5 NEOPLASM OF UNCERTAIN BEHAVIOR OF SKIN: ICD-10-CM

## 2019-10-18 ENCOUNTER — TELEPHONE (OUTPATIENT)
Dept: NEUROLOGY | Facility: CLINIC | Age: 44
End: 2019-10-18

## 2019-10-18 DIAGNOSIS — R56.9 SEIZURES (HCC): Primary | ICD-10-CM

## 2019-10-18 RX ORDER — LEVETIRACETAM 250 MG/1
500 TABLET ORAL 2 TIMES DAILY
Qty: 1 TABLET | Refills: 0 | COMMUNITY
Start: 2019-10-18 | End: 2019-10-21 | Stop reason: DRUGHIGH

## 2019-10-18 NOTE — TELEPHONE ENCOUNTER
Mother is concerned and was told to be seen asap with Dr Mónica Segovia for f/u appt, pt had 3 seizures while at hospital

## 2019-10-18 NOTE — TELEPHONE ENCOUNTER
S:  Pt hospitalized this week at [facility] for breakthrough seizures. Was discharged today, and is now hallucinating. B:  Pt with hx of stroke and seizure disorder, last seen on 9/20/19. Per LOV notes:  Assessment:   This is a 36 y/o fem

## 2019-10-21 ENCOUNTER — OFFICE VISIT (OUTPATIENT)
Dept: NEUROLOGY | Facility: CLINIC | Age: 44
End: 2019-10-21

## 2019-10-21 VITALS
SYSTOLIC BLOOD PRESSURE: 138 MMHG | DIASTOLIC BLOOD PRESSURE: 70 MMHG | RESPIRATION RATE: 16 BRPM | BODY MASS INDEX: 32 KG/M2 | HEART RATE: 76 BPM | WEIGHT: 206 LBS

## 2019-10-21 DIAGNOSIS — I63.9 CEREBROVASCULAR ACCIDENT (CVA), UNSPECIFIED MECHANISM (HCC): ICD-10-CM

## 2019-10-21 DIAGNOSIS — R56.9 SEIZURES (HCC): Primary | ICD-10-CM

## 2019-10-21 PROCEDURE — 99213 OFFICE O/P EST LOW 20 MIN: CPT | Performed by: OTHER

## 2019-10-21 RX ORDER — CLOPIDOGREL BISULFATE 75 MG/1
1 TABLET ORAL DAILY
Refills: 3 | COMMUNITY
Start: 2019-10-12

## 2019-10-21 RX ORDER — LEVETIRACETAM 500 MG/1
TABLET ORAL
Qty: 90 TABLET | Refills: 1 | Status: SHIPPED | OUTPATIENT
Start: 2019-10-21 | End: 2020-01-16

## 2019-10-21 RX ORDER — LEVETIRACETAM 500 MG/1
500 TABLET ORAL 2 TIMES DAILY
Refills: 0 | COMMUNITY
Start: 2019-10-18 | End: 2019-10-21

## 2019-10-21 NOTE — PROGRESS NOTES
The patient had three seizures, however no seizures since leaving the hospital. Patient is having memory issues. The patient was having visual and sound hallucination. Patient is having auras at least 10 times a day lasting 1-2 minutes.

## 2019-10-21 NOTE — TELEPHONE ENCOUNTER
Called patient's mother (ok per HIPPA) and informed her of the below noted information from Dr. Vasquez Smiley. Per patient's mother, patient has slowly been getting better over the weekend, but is still not back to her \"baseline\".   She states she is still

## 2019-10-21 NOTE — TELEPHONE ENCOUNTER
If she is actively hallucinating she may need to go back to the ED. She had never had any hallucinations when I saw her once before. I can see her on Friday around my other add on patient but she may have to wait for me as Im on call that day as well.

## 2019-10-21 NOTE — PROGRESS NOTES
Neurology H&P    [facility] Patient Status:  No patient class for patient encounter    1975 MRN [id number]   Location [facility], [address] [place] [address] Drive, 232 [facility] Attending No att. providers found   [facility] Day # 0 PCP Shiloh Hobson MD Keppra 250/500 for her seizure. It was not clear at that time if it was induced by Chantix use or not. She comes to see me today following another seizure.  She states that on Oct. 15th she was at her fathers and started to feel \"wonky\" and then her legs a thin layer to the affected areas each morning, Disp: 50 g, Rfl: 2  Clobetasol Propionate (TEMOVATE) 0.05 % External Solution, Apply to scalp every morning, Disp: 50 mL, Rfl: 0  Calcipotriene (CALCITRENE) 0.005 % External Ointment, Apply to body nightly, increase blood flow.  implanted in groin       SocHx:  Social History    Tobacco Use      Smoking status: Former Smoker        Packs/day: 0.00        Years: 0.00        Pack years: 0        Quit date: 2018        Years since quittin.2      Smokeles brachioradialis, 2+ at patella, 2+ at the ankles    GAIT: normal stance, normal toe gait and unsteady heal gait, tandem is unsteady    Romberg's: negative      Labs:       Imaging:  MRI Brain 10/22/18  CONCLUSION:       1.   No acute intracranial abnormalit

## 2019-10-24 RX ORDER — PAROXETINE HYDROCHLORIDE 20 MG/1
TABLET, FILM COATED ORAL
Qty: 90 TABLET | Refills: 3 | Status: ON HOLD | OUTPATIENT
Start: 2019-10-24 | End: 2020-01-03

## 2019-10-24 NOTE — TELEPHONE ENCOUNTER
Last OV: 1/7/19 with Dr. Urmila Padilla  Last refill date: 7/26/19     #/refills: #90, 0 refills  When pt was asked to return for OV: no follow-up on file  Upcoming appt/reason: no upcoming appt  Last labs 4/27/19

## 2019-11-04 ENCOUNTER — OFFICE VISIT (OUTPATIENT)
Dept: INTERNAL MEDICINE CLINIC | Facility: CLINIC | Age: 44
End: 2019-11-04

## 2019-11-04 VITALS
HEART RATE: 88 BPM | DIASTOLIC BLOOD PRESSURE: 76 MMHG | TEMPERATURE: 99 F | SYSTOLIC BLOOD PRESSURE: 104 MMHG | BODY MASS INDEX: 31 KG/M2 | WEIGHT: 201 LBS | OXYGEN SATURATION: 98 % | RESPIRATION RATE: 16 BRPM

## 2019-11-04 DIAGNOSIS — I63.9 CEREBROVASCULAR ACCIDENT (CVA), UNSPECIFIED MECHANISM (HCC): ICD-10-CM

## 2019-11-04 DIAGNOSIS — R56.9 SEIZURES (HCC): Primary | ICD-10-CM

## 2019-11-04 DIAGNOSIS — F32.A DEPRESSION, UNSPECIFIED DEPRESSION TYPE: ICD-10-CM

## 2019-11-04 DIAGNOSIS — Z12.31 VISIT FOR SCREENING MAMMOGRAM: ICD-10-CM

## 2019-11-04 DIAGNOSIS — F41.9 ANXIETY DISORDER, UNSPECIFIED TYPE: ICD-10-CM

## 2019-11-04 PROCEDURE — 99214 OFFICE O/P EST MOD 30 MIN: CPT | Performed by: FAMILY MEDICINE

## 2019-11-04 NOTE — PROGRESS NOTES
HPI:    Patient ID: Ann Cadet is a 53year old female. HPI  Ann Cadet is a 53year old female.   HPI:   Here for f/u from [facility] admission for seizures   Was in twice for it   The Smita Ford was increase     Seeing Dr Gail Segovia   To have another EE Past Medical History:   Diagnosis Date   • Atherosclerosis of coronary artery    • Cerebrovascular accident (CVA) ([facility] 1/7/2019   • Congestive heart disease ([facility]    • Essential hypertension    • Heart attack ([facility]    • High blood pressure    • Hyp 3  Clopidogrel Bisulfate 75 MG Oral Tab, Take 1 tablet by mouth daily. , Disp: , Rfl: 3  levETIRAcetam 500 MG Oral Tab, Take 1.5 tabs (750mg) BID, Disp: 90 tablet, Rfl: 1  Fluocinolone Acetonide Scalp 0.01 % External Oil, Apply to scalp overnight with a krish

## 2019-11-21 ENCOUNTER — CLINICAL ABSTRACT (OUTPATIENT)
Dept: CARDIOLOGY | Age: 44
End: 2019-11-21

## 2019-11-26 ENCOUNTER — HOSPITAL ENCOUNTER (OUTPATIENT)
Dept: MAMMOGRAPHY | Age: 44
Discharge: HOME | End: 2019-11-26
Attending: FAMILY MEDICINE
Payer: COMMERCIAL

## 2019-11-26 DIAGNOSIS — Z12.31 VISIT FOR SCREENING MAMMOGRAM: ICD-10-CM

## 2019-11-26 PROCEDURE — 77067 SCR MAMMO BI INCL CAD: CPT | Performed by: FAMILY MEDICINE

## 2019-11-26 PROCEDURE — 77063 BREAST TOMOSYNTHESIS BI: CPT | Performed by: FAMILY MEDICINE

## 2019-12-02 ENCOUNTER — NURSE ONLY (OUTPATIENT)
Dept: ELECTROPHYSIOLOGY | Facility: HOSPITAL | Age: 44
End: 2019-12-02
Attending: Other
Payer: COMMERCIAL

## 2019-12-02 DIAGNOSIS — R56.9 SEIZURES (HCC): ICD-10-CM

## 2019-12-02 PROCEDURE — 95812 EEG 41-60 MINUTES: CPT | Performed by: OTHER

## 2019-12-03 ENCOUNTER — TELEPHONE (OUTPATIENT)
Dept: NEUROLOGY | Facility: CLINIC | Age: 44
End: 2019-12-03

## 2019-12-03 NOTE — TELEPHONE ENCOUNTER
I called Mrs. Yeager regarding her seizures and depression. She did not answer and I left a message for her to call back to the clinic.  Per PSR note today \"is feeling better and has a little depression but seeing a therapist\"

## 2019-12-03 NOTE — TELEPHONE ENCOUNTER
Per PSR note.  pt thought she had appt today; r/s for Saul; pt wanted to update that she has had no more seizures, feeling better, sleeping a lot and a bit of depression; pt is seeing a psychologist

## 2019-12-04 ENCOUNTER — OFFICE VISIT (OUTPATIENT)
Dept: INTERNAL MEDICINE CLINIC | Facility: CLINIC | Age: 44
End: 2019-12-04

## 2019-12-04 VITALS
OXYGEN SATURATION: 99 % | SYSTOLIC BLOOD PRESSURE: 98 MMHG | HEART RATE: 70 BPM | DIASTOLIC BLOOD PRESSURE: 68 MMHG | RESPIRATION RATE: 16 BRPM | TEMPERATURE: 98 F | WEIGHT: 197 LBS | BODY MASS INDEX: 31 KG/M2

## 2019-12-04 DIAGNOSIS — I10 ESSENTIAL HYPERTENSION: Primary | ICD-10-CM

## 2019-12-04 DIAGNOSIS — R56.9 SEIZURES (HCC): ICD-10-CM

## 2019-12-04 DIAGNOSIS — J02.9 SORE THROAT: ICD-10-CM

## 2019-12-04 DIAGNOSIS — F32.A DEPRESSION, UNSPECIFIED DEPRESSION TYPE: ICD-10-CM

## 2019-12-04 PROCEDURE — 99214 OFFICE O/P EST MOD 30 MIN: CPT | Performed by: FAMILY MEDICINE

## 2019-12-04 NOTE — PROGRESS NOTES
HPI:    Patient ID: Carole Castillo is a 53year old female. HPI  . Carole Castillo is a 53year old female.   HPI:   Here for f/u on the sz   Is on the [address] still and no recent sz   Has been dealing w depression, seeing psych weekly    Had loved [facility])    • Psoriasis    • Seizure disorder [facility])       Social History:  Social History    Tobacco Use      Smoking status: Former Smoker        Packs/day: 0.00        Years: 0.00        Pack years: 0        Quit date: 7/22/2018        Years since quitting: aspirin 81 MG Oral Tab Take 81 mg by mouth daily.      • Halobetasol Propionate 0.05 % External Ointment Apply a thin layer to the affected areas each morning 50 g 2   • Calcipotriene (CALCITRENE) 0.005 % External Ointment Apply to body nightly 60 g 2   • C

## 2019-12-06 ENCOUNTER — TELEPHONE (OUTPATIENT)
Dept: NEUROLOGY | Facility: CLINIC | Age: 44
End: 2019-12-06

## 2019-12-06 NOTE — PROCEDURES
[facility] - ELECTROENCEPHALOGRAM (EEG) REPORT  Patient Name:  Kaushik Narvaez   MRN / CSN:  [id number] / [id number]   Date of Birth / Age:  11/2/1975 /  53year old   Encounter Date:  12/2/19         METHODS:  Twenty-two electrodes were applied according to the Rfl:   •  carvedilol 6.25 MG Oral Tab, Take 6.25 mg by mouth 2 (two) times daily with meals. , Disp: , Rfl:       FINDINGS:  1) Background: A posterior-dominant background rhythm of 8-9 Hz with an amplitude of 20-30 microvolts is seen.   2) Sleep: No sleep c

## 2019-12-06 NOTE — TELEPHONE ENCOUNTER
Eliane did call me back and I explained to her that her EEG was normal and she verbalized understanding. I did report to her that her psychologist was worried about her depression.  Eliane reports that she may have a bit more depression than usual but is

## 2019-12-06 NOTE — TELEPHONE ENCOUNTER
I called Ms. Yeager regarding her recent EEG and to follow up on her psychologist concern about depression. She again did not answer and I left a VM for her to call back to clinic with any questions.  Her EEG was unremarkable

## 2019-12-09 ENCOUNTER — TELEPHONE (OUTPATIENT)
Dept: INTERNAL MEDICINE CLINIC | Facility: CLINIC | Age: 44
End: 2019-12-09

## 2019-12-09 NOTE — TELEPHONE ENCOUNTER
Patient's Therapist: Aaron Murillo, Private practice counselor, called in requesting to speak directly to Dr. Kev Bender to coordinate care for mutual patient. Please call back Stephany at [phone].

## 2019-12-18 RX ORDER — ATORVASTATIN CALCIUM 40 MG/1
TABLET, FILM COATED ORAL
Qty: 90 TABLET | Refills: 1 | Status: SHIPPED | OUTPATIENT
Start: 2019-12-18 | End: 2020-06-24

## 2019-12-26 ENCOUNTER — TELEPHONE (OUTPATIENT)
Dept: INTERNAL MEDICINE CLINIC | Facility: CLINIC | Age: 44
End: 2019-12-26

## 2019-12-26 DIAGNOSIS — F32.A DEPRESSION, UNSPECIFIED DEPRESSION TYPE: Primary | ICD-10-CM

## 2019-12-26 NOTE — TELEPHONE ENCOUNTER
Patient's mother called requesting referral for zoila washington. Patient needs to go for reassessment. Mother states that they have been in contact with psychologist and neurologist, and are advising her to go to [facility].

## 2019-12-26 NOTE — TELEPHONE ENCOUNTER
Spoke with mother and advised referrals not needed for mental health.  Mom stated she called referral dept and told to get order from pcp for zoila washington/ Efren see's her counselor and is under treatment ,mother reports daughter is more depressed ,not eating m

## 2019-12-29 PROBLEM — F33.2 MAJOR DEPRESSIVE DISORDER, RECURRENT SEVERE WITHOUT PSYCHOTIC FEATURES (HCC): Status: ACTIVE | Noted: 2019-12-29

## 2019-12-30 PROBLEM — E03.9 HYPOTHYROIDISM: Status: ACTIVE | Noted: 2019-12-30

## 2020-01-04 ENCOUNTER — PATIENT OUTREACH (OUTPATIENT)
Dept: CASE MANAGEMENT | Age: 45
End: 2020-01-04

## 2020-01-10 ENCOUNTER — PATIENT OUTREACH (OUTPATIENT)
Dept: CASE MANAGEMENT | Age: 45
End: 2020-01-10

## 2020-01-10 ENCOUNTER — LABORATORY ENCOUNTER (OUTPATIENT)
Dept: LAB | Age: 45
End: 2020-01-10
Attending: INTERNAL MEDICINE
Payer: COMMERCIAL

## 2020-01-10 DIAGNOSIS — F09 MILD COGNITIVE DISORDER: ICD-10-CM

## 2020-01-10 DIAGNOSIS — Z51.81 THERAPEUTIC DRUG MONITORING: ICD-10-CM

## 2020-01-10 DIAGNOSIS — I50.22 CHRONIC SYSTOLIC HEART FAILURE (HCC): Primary | ICD-10-CM

## 2020-01-10 DIAGNOSIS — E55.9 VITAMIN D DEFICIENCY: ICD-10-CM

## 2020-01-10 DIAGNOSIS — I25.5 ISCHEMIC CARDIOMYOPATHY: ICD-10-CM

## 2020-01-10 DIAGNOSIS — F32.A DEPRESSION, UNSPECIFIED DEPRESSION TYPE: ICD-10-CM

## 2020-01-10 LAB
ABSOLUTE IMMATURE GRANULOCYTES (OFFPRE24): NORMAL
ALBUMIN SERPL-MCNC: 3.4 G/DL
ALBUMIN SERPL-MCNC: 3.4 G/DL (ref 3.4–5)
ALBUMIN/GLOB SERPL: 0.9 {RATIO} (ref 1–2)
ALBUMIN/GLOB SERPL: NORMAL {RATIO}
ALP LIVER SERPL-CCNC: 105 U/L (ref 39–100)
ALP SERPL-CCNC: 105 U/L
ALT SERPL-CCNC: 24 U/L
ALT SERPL-CCNC: 24 U/L (ref 13–56)
ANION GAP SERPL CALC-SCNC: 6 MMOL/L (ref 0–18)
ANION GAP SERPL CALC-SCNC: NORMAL MMOL/L
AST SERPL-CCNC: 13 U/L
AST SERPL-CCNC: 13 U/L (ref 15–37)
BASO+EOS+MONOS # BLD: NORMAL 10*3/UL
BASO+EOS+MONOS NFR BLD: NORMAL %
BASOPHILS # BLD AUTO: 0.04 X10(3) UL (ref 0–0.2)
BASOPHILS # BLD: NORMAL 10*3/UL
BASOPHILS NFR BLD AUTO: 0.6 %
BASOPHILS NFR BLD: NORMAL %
BILIRUB SERPL-MCNC: 0.4 MG/DL
BILIRUB SERPL-MCNC: 0.4 MG/DL (ref 0.1–2)
BUN BLD-MCNC: 12 MG/DL (ref 7–18)
BUN SERPL-MCNC: 12 MG/DL
BUN/CREAT SERPL: 13 (ref 10–20)
BUN/CREAT SERPL: NORMAL
CALCIUM BLD-MCNC: 9.4 MG/DL (ref 8.5–10.1)
CALCIUM SERPL-MCNC: 9.4 MG/DL
CHLORIDE SERPL-SCNC: 111 MMOL/L
CHLORIDE SERPL-SCNC: 111 MMOL/L (ref 98–112)
CHOLEST SERPL-MCNC: 108 MG/DL
CHOLEST SMN-MCNC: 108 MG/DL (ref ?–200)
CHOLEST/HDLC SERPL: NORMAL {RATIO}
CO2 SERPL-SCNC: 23 MMOL/L (ref 21–32)
CO2 SERPL-SCNC: NORMAL MMOL/L
CREAT BLD-MCNC: 0.92 MG/DL (ref 0.55–1.02)
CREAT SERPL-MCNC: 0.92 MG/DL
DEPRECATED RDW RBC AUTO: 48.8 FL (ref 35.1–46.3)
DIFFERENTIAL METHOD BLD: NORMAL
EOSINOPHIL # BLD AUTO: 0.11 X10(3) UL (ref 0–0.7)
EOSINOPHIL # BLD: NORMAL 10*3/UL
EOSINOPHIL NFR BLD AUTO: 1.5 %
EOSINOPHIL NFR BLD: NORMAL %
ERYTHROCYTE [DISTWIDTH] IN BLOOD BY AUTOMATED COUNT: 14.9 % (ref 11–15)
ERYTHROCYTE [DISTWIDTH] IN BLOOD: NORMAL %
FOLATE SERPL-MCNC: 11.1 NG/ML (ref 8.7–?)
GLOBULIN PLAS-MCNC: 3.7 G/DL (ref 2.8–4.4)
GLOBULIN SER-MCNC: 3.7 G/DL
GLUCOSE BLD-MCNC: 105 MG/DL (ref 70–99)
GLUCOSE SERPL-MCNC: 105 MG/DL
HAV IGM SER QL: 2.1 MG/DL (ref 1.6–2.6)
HCT VFR BLD AUTO: 42.2 % (ref 35–48)
HCT VFR BLD CALC: 42.2 %
HDLC SERPL-MCNC: 39 MG/DL
HDLC SERPL-MCNC: 39 MG/DL (ref 40–59)
HGB BLD-MCNC: 13.7 G/DL
HGB BLD-MCNC: 13.7 G/DL (ref 12–16)
IMM GRANULOCYTES # BLD AUTO: 0.02 X10(3) UL (ref 0–1)
IMM GRANULOCYTES NFR BLD: 0.3 %
IMMATURE GRANULOCYTES (OFFPRE25): NORMAL
LDLC SERPL CALC-MCNC: 52 MG/DL
LDLC SERPL CALC-MCNC: 52 MG/DL (ref ?–100)
LENGTH OF FAST TIME PATIENT: NORMAL H
LENGTH OF FAST TIME PATIENT: NORMAL H
LYMPHOCYTES # BLD AUTO: 2.14 X10(3) UL (ref 1–4)
LYMPHOCYTES # BLD: NORMAL 10*3/UL
LYMPHOCYTES NFR BLD AUTO: 29.8 %
LYMPHOCYTES NFR BLD: NORMAL %
M PROTEIN MFR SERPL ELPH: 7.1 G/DL (ref 6.4–8.2)
MCH RBC QN AUTO: 28.9 PG (ref 26–34)
MCH RBC QN AUTO: NORMAL PG
MCHC RBC AUTO-ENTMCNC: 32.5 G/DL (ref 31–37)
MCHC RBC AUTO-ENTMCNC: NORMAL G/DL
MCV RBC AUTO: 89 FL (ref 80–100)
MCV RBC AUTO: NORMAL FL
MONOCYTES # BLD AUTO: 0.67 X10(3) UL (ref 0.1–1)
MONOCYTES # BLD: NORMAL 10*3/UL
MONOCYTES NFR BLD AUTO: 9.3 %
MONOCYTES NFR BLD: NORMAL %
MPV (OFFPRE2): NORMAL
NEUTROPHILS # BLD AUTO: 4.19 X10 (3) UL (ref 1.5–7.7)
NEUTROPHILS # BLD AUTO: 4.19 X10(3) UL (ref 1.5–7.7)
NEUTROPHILS # BLD: NORMAL 10*3/UL
NEUTROPHILS NFR BLD AUTO: 58.5 %
NEUTROPHILS NFR BLD: NORMAL %
NONHDLC SERPL-MCNC: 69 MG/DL
NONHDLC SERPL-MCNC: 69 MG/DL (ref ?–130)
NRBC BLD MANUAL-RTO: NORMAL %
NT-PROBNP SERPL-MCNC: 554 PG/ML
NT-PROBNP SERPL-MCNC: 554 PG/ML (ref ?–125)
OSMOLALITY SERPL CALC.SUM OF ELEC: 290 MOSM/KG (ref 275–295)
PATIENT FASTING Y/N/NP: YES
PATIENT FASTING Y/N/NP: YES
PLAT MORPH BLD: NORMAL
PLATELET # BLD AUTO: 194 10(3)UL (ref 150–450)
PLATELET # BLD: 194 10*3/UL
POTASSIUM SERPL-SCNC: 3.9 MMOL/L
POTASSIUM SERPL-SCNC: 3.9 MMOL/L (ref 3.5–5.1)
PROT SERPL-MCNC: 7.1 G/DL
RBC # BLD AUTO: 4.74 X10(6)UL (ref 3.8–5.3)
RBC # BLD: 4.74 10*6/UL
RBC MORPH BLD: NORMAL
SODIUM SERPL-SCNC: 140 MMOL/L
SODIUM SERPL-SCNC: 140 MMOL/L (ref 136–145)
TRIGL SERPL-MCNC: 86 MG/DL
TRIGL SERPL-MCNC: 86 MG/DL (ref 30–149)
TSI SER-ACNC: 2.92 MIU/ML (ref 0.36–3.74)
VIT B12 SERPL-MCNC: 464 PG/ML (ref 193–986)
VIT D+METAB SERPL-MCNC: 30.1 NG/ML (ref 30–100)
VLDLC SERPL CALC-MCNC: 17 MG/DL (ref 0–30)
VLDLC SERPL CALC-MCNC: NORMAL MG/DL
WBC # BLD AUTO: 7.2 X10(3) UL (ref 4–11)
WBC # BLD: 7.2 10*3/UL
WBC MORPH BLD: NORMAL

## 2020-01-10 PROCEDURE — 80061 LIPID PANEL: CPT

## 2020-01-10 PROCEDURE — 85025 COMPLETE CBC W/AUTO DIFF WBC: CPT

## 2020-01-10 PROCEDURE — 84443 ASSAY THYROID STIM HORMONE: CPT

## 2020-01-10 PROCEDURE — 83880 ASSAY OF NATRIURETIC PEPTIDE: CPT

## 2020-01-10 PROCEDURE — 82607 VITAMIN B-12: CPT

## 2020-01-10 PROCEDURE — 83735 ASSAY OF MAGNESIUM: CPT

## 2020-01-10 PROCEDURE — 82746 ASSAY OF FOLIC ACID SERUM: CPT

## 2020-01-10 PROCEDURE — 36415 COLL VENOUS BLD VENIPUNCTURE: CPT

## 2020-01-10 PROCEDURE — 80053 COMPREHEN METABOLIC PANEL: CPT

## 2020-01-10 PROCEDURE — 82306 VITAMIN D 25 HYDROXY: CPT

## 2020-01-13 ENCOUNTER — CLINICAL ABSTRACT (OUTPATIENT)
Dept: CARDIOLOGY | Age: 45
End: 2020-01-13

## 2020-01-13 NOTE — PROGRESS NOTES
Reviewed lab results, which include a normal vitamin B12 of 464 and a normal folate level 11.1 as well as a normal vitamin D level, although her vitamin D level is just above the lowest limit of normal (i.e. it is 30.1.)  Will discuss these results with th

## 2020-01-14 NOTE — PROGRESS NOTES
Name: Kami Weeks       : 1975   Gender: female   Race: White   Ethnicity: NON  OR  OR  ETHNICITY   Employer Group:   [employer] Member ID:   [id number]   Medical Group Name: [facility]   Member Ester with a plan to overdose on medications. She has since stepped down to PHP. Clinical Rationale for CCM - State the specific diagnosis(es) or event(s) that led to the identification for CCM  Patient is very depressed due to loss of functioning.  She three     Alert and oriented x3 – good cognition        X Requires prompting, reminders        X Cognitive deficits due to stroke, brain injury, other diagnosis         Vegetative state, coma     Disoriented to person, place or time, impaired memory contacts     Legally blind     Hearing impaired     Deaf mute    Evaluation of member's specific vision/hearing needs, if Applicable :     Pt has much loss of sight. Only has 30% of peripheral vision.  She did not say whether she is considered l resources):  Patient has a therapist. Will need a psychiatrist. Patient probably should qualify for some basic services such as [employer] bus services.       Social Determinates of Health:       Safe Housing  Both  and patient describe their home and comm Therapist whom she feels has been helpful and plans on returning to her. She will need a psychiatrist after discharge from [facility] and I will e-mail her a list as well as her clinical therapist at [facility].  I educated pt and  on difference between psychi

## 2020-01-15 ENCOUNTER — TELEPHONE (OUTPATIENT)
Dept: CARDIOLOGY | Age: 45
End: 2020-01-15

## 2020-01-16 ENCOUNTER — OFFICE VISIT (OUTPATIENT)
Dept: NEUROLOGY | Facility: CLINIC | Age: 45
End: 2020-01-16

## 2020-01-16 ENCOUNTER — TELEPHONE (OUTPATIENT)
Dept: CARDIOLOGY | Age: 45
End: 2020-01-16

## 2020-01-16 VITALS
BODY MASS INDEX: 35 KG/M2 | RESPIRATION RATE: 16 BRPM | WEIGHT: 190 LBS | DIASTOLIC BLOOD PRESSURE: 76 MMHG | HEART RATE: 72 BPM | SYSTOLIC BLOOD PRESSURE: 120 MMHG

## 2020-01-16 DIAGNOSIS — R56.9 SEIZURES (HCC): Primary | ICD-10-CM

## 2020-01-16 PROCEDURE — 99213 OFFICE O/P EST LOW 20 MIN: CPT | Performed by: OTHER

## 2020-01-16 RX ORDER — LEVETIRACETAM 500 MG/1
TABLET ORAL
Qty: 270 TABLET | Refills: 1 | Status: SHIPPED | OUTPATIENT
Start: 2020-01-16 | End: 2020-07-21

## 2020-01-16 NOTE — PROGRESS NOTES
Neurology H&P    [address] [facility] Patient Status:  No patient class for patient encounter    1975 MRN [id number]   Location [facility], [address] [place] [address] Drive, 232 [facility] Attending No att. providers found   [facility] Day # 0 PCP Shiloh Hobson MD Keppra 750mg BID for seizure prophylaxis. She states that she has not aad any seizure since her last clinic visit. She denies any LOC or AMS. NO shaking spells.      Current Medications:  Current Outpatient Medications   Medication Sig Dispense Refill   • C • Seizure disorder [facility])        PSHx:  Past Surgical History:   Procedure Laterality Date   • IR STENT      7/2018   • OTHER      Impalla insertion in groin to increase blood flow.  implanted in groin       SocHx:  Social History    Tobacco Use      Smoki and LEs     SENSORY EXAMINATION:  UE: intact to light touch, pinprick intact  LE: intact to light touch, pinprick intact    COORDINATION:  No dysmetria, or intention tremors    REFLEXES: 2+ at biceps, 2+ brachioradialis, 2+ at patella, 2+ at the ankles

## 2020-02-03 ENCOUNTER — OFFICE VISIT (OUTPATIENT)
Dept: INTERNAL MEDICINE CLINIC | Facility: CLINIC | Age: 45
End: 2020-02-03

## 2020-02-03 VITALS
BODY MASS INDEX: 34 KG/M2 | HEART RATE: 83 BPM | TEMPERATURE: 98 F | RESPIRATION RATE: 16 BRPM | SYSTOLIC BLOOD PRESSURE: 122 MMHG | DIASTOLIC BLOOD PRESSURE: 76 MMHG | WEIGHT: 188 LBS | OXYGEN SATURATION: 98 %

## 2020-02-03 DIAGNOSIS — Z48.02 VISIT FOR SUTURE REMOVAL: ICD-10-CM

## 2020-02-03 DIAGNOSIS — S01.01XA LACERATION OF SCALP, INITIAL ENCOUNTER: Primary | ICD-10-CM

## 2020-02-03 PROCEDURE — 99212 OFFICE O/P EST SF 10 MIN: CPT | Performed by: FAMILY MEDICINE

## 2020-02-03 PROCEDURE — 99024 POSTOP FOLLOW-UP VISIT: CPT | Performed by: FAMILY MEDICINE

## 2020-02-03 NOTE — PROGRESS NOTES
HPI:    Patient ID: Kaushik Narvaez is a 53year old female.     HPI  8d ago tripped at home and cut the L side of head    Seen  In [facility]   Got 4 staples   Got tetanus shot as well    Was sore at first   OK now   No HA   No DC  No bleeding   Getting neospo

## 2020-02-05 ENCOUNTER — OFFICE VISIT (OUTPATIENT)
Dept: CARDIOLOGY | Age: 45
End: 2020-02-05

## 2020-02-05 VITALS
HEIGHT: 63 IN | WEIGHT: 187 LBS | SYSTOLIC BLOOD PRESSURE: 102 MMHG | BODY MASS INDEX: 33.13 KG/M2 | RESPIRATION RATE: 18 BRPM | DIASTOLIC BLOOD PRESSURE: 68 MMHG | HEART RATE: 72 BPM

## 2020-02-05 DIAGNOSIS — E78.00 PURE HYPERCHOLESTEROLEMIA: ICD-10-CM

## 2020-02-05 DIAGNOSIS — I50.22 HEART FAILURE, LEFT SYSTOLIC, CHRONIC (CMD): Primary | ICD-10-CM

## 2020-02-05 DIAGNOSIS — E55.9 VITAMIN D DEFICIENCY: ICD-10-CM

## 2020-02-05 PROCEDURE — 99215 OFFICE O/P EST HI 40 MIN: CPT | Performed by: INTERNAL MEDICINE

## 2020-02-05 RX ORDER — LEVETIRACETAM 500 MG/1
750 TABLET ORAL 2 TIMES DAILY
COMMUNITY
Start: 2020-01-16

## 2020-02-05 ASSESSMENT — ENCOUNTER SYMPTOMS
BRUISES/BLEEDS EASILY: 0
HEMOPTYSIS: 0
SUSPICIOUS LESIONS: 0
WEIGHT LOSS: 1
CHILLS: 0
COUGH: 0
FEVER: 0
WEIGHT GAIN: 0
ALLERGIC/IMMUNOLOGIC COMMENTS: NO NEW FOOD ALLERGIES
HEMATOCHEZIA: 0

## 2020-02-05 ASSESSMENT — PATIENT HEALTH QUESTIONNAIRE - PHQ9: 2. FEELING DOWN, DEPRESSED OR HOPELESS: NOT AT ALL

## 2020-02-12 NOTE — PROGRESS NOTES
Patient Name: Mary Lou Gregory       YOB: 1975   Gender: female   Employer Group:   [id number] Member ID:    [id number]     Medical Group Name: [facility]   Member Telephone: Telephone Information:   Home Phone [phone] END DATE    Paxil 1/4/20 30 mg QD                                                                                            Date of Last Psychiatrist Appointment   1/31/20           Date of Last Specialist Date   1/31/20          Type of Specialist   Psyc her exercises. Pt going to the eye dotor today and knows she will need new prescription as her eyes have deteriorated. This is one of the steps she is taking to take care of herself. Pt is not seeing psychiatrist right now.  I told her that this is OK as l

## 2020-03-10 ENCOUNTER — OFFICE VISIT (OUTPATIENT)
Dept: INTERNAL MEDICINE CLINIC | Facility: CLINIC | Age: 45
End: 2020-03-10

## 2020-03-10 ENCOUNTER — HOSPITAL ENCOUNTER (OUTPATIENT)
Dept: CV DIAGNOSTICS | Age: 45
Discharge: HOME | End: 2020-03-10
Attending: INTERNAL MEDICINE
Payer: COMMERCIAL

## 2020-03-10 VITALS
TEMPERATURE: 99 F | RESPIRATION RATE: 16 BRPM | DIASTOLIC BLOOD PRESSURE: 60 MMHG | WEIGHT: 181 LBS | HEART RATE: 88 BPM | OXYGEN SATURATION: 98 % | BODY MASS INDEX: 33 KG/M2 | SYSTOLIC BLOOD PRESSURE: 106 MMHG

## 2020-03-10 DIAGNOSIS — I10 ESSENTIAL HYPERTENSION: Primary | ICD-10-CM

## 2020-03-10 DIAGNOSIS — I50.22 HEART FAILURE, LEFT SYSTOLIC, CHRONIC (HCC): ICD-10-CM

## 2020-03-10 DIAGNOSIS — R41.89 COGNITIVE CHANGE: ICD-10-CM

## 2020-03-10 DIAGNOSIS — I25.10 CORONARY ARTERY DISEASE INVOLVING NATIVE CORONARY ARTERY OF NATIVE HEART WITHOUT ANGINA PECTORIS: ICD-10-CM

## 2020-03-10 DIAGNOSIS — F17.200 SMOKING ADDICTION: ICD-10-CM

## 2020-03-10 PROCEDURE — 99214 OFFICE O/P EST MOD 30 MIN: CPT | Performed by: FAMILY MEDICINE

## 2020-03-10 PROCEDURE — 93306 TTE W/DOPPLER COMPLETE: CPT | Performed by: INTERNAL MEDICINE

## 2020-03-10 NOTE — PROGRESS NOTES
HPI:    Patient ID: Tyler Bowser is a 53year old female. HPI  Tyler Bowser is a 53year old female.   HPI:   Here for f/u   Using Almased supplement for BF and lunch and sensible dinner in addition to exercsie daily    Lost weight    Feels a l Frequency: Never    Drug use: No       REVIEW OF SYSTEMS:   GENERAL HEALTH: feels well otherwise  SKIN: denies rashes  RESPIRATORY: denies shortness of breath   CARDIOVASCULAR: denies chest pain on exertion  GI: denies abdominal pain  NEURO: denies head Apply to body nightly 60 g 2   • Fluocinolone Acetonide Scalp 0.01 % External Oil Apply to scalp overnight with a showercap.  Shampoo off in the morning 1 Bottle 2     Allergies:  Adhesive Tape           RASH  Other                   RASH    Comment:Any yani

## 2020-03-11 DIAGNOSIS — I50.22 HEART FAILURE, LEFT SYSTOLIC, CHRONIC (CMD): ICD-10-CM

## 2020-03-24 NOTE — PROGRESS NOTES
Pt has not responded to phone outreach, mailed out [place] and closure letter to member on 3/24/2020.

## 2020-03-25 NOTE — PROGRESS NOTES
Left another message on the below # at 3:15pm on 3/25/20 asking to have her call back for monthly contact.

## 2020-04-01 RX ORDER — PAROXETINE 30 MG/1
30 TABLET, FILM COATED ORAL DAILY
Qty: 30 TABLET | Refills: 2 | Status: SHIPPED | OUTPATIENT
Start: 2020-04-01 | End: 2020-07-03

## 2020-04-01 NOTE — TELEPHONE ENCOUNTER
Paroxetine 30 mg  Last OV relevant to medication: 11-4-19  Last refill date: 1-4-20 #/refills: 0  When pt was asked to return for OV: none  Upcoming appt/reason: 6-10-20  Recent labs: none

## 2020-04-01 NOTE — TELEPHONE ENCOUNTER
Patient called requesting refill for: PARoxetine HCl 30 MG Oral Tab    Stated that Dr. Su Cohen previously prescribed 20 MG, but then Dr. Tejinder Lowry increased to 30 MG. No longer a patient of Dr. Tejinder Lowry. Please clarify dosage and send refill if appropriate.     [facility], [address] [place] [address]  [address] [facility] [facility] [phone], [phone]

## 2020-04-09 ENCOUNTER — TELEPHONE (OUTPATIENT)
Dept: CARDIOLOGY | Age: 45
End: 2020-04-09

## 2020-04-09 RX ORDER — CARVEDILOL 6.25 MG/1
6.25 TABLET ORAL 2 TIMES DAILY
Qty: 180 TABLET | Refills: 3 | OUTPATIENT
Start: 2020-04-09

## 2020-04-09 RX ORDER — CARVEDILOL 3.12 MG/1
3.12 TABLET ORAL 2 TIMES DAILY WITH MEALS
Qty: 180 TABLET | Refills: 3 | Status: SHIPPED | OUTPATIENT
Start: 2020-04-09

## 2020-04-23 ENCOUNTER — TELEPHONE (OUTPATIENT)
Dept: CARDIOLOGY | Age: 45
End: 2020-04-23

## 2020-05-07 ENCOUNTER — TELEPHONE (OUTPATIENT)
Dept: CARDIOLOGY | Age: 45
End: 2020-05-07

## 2020-05-12 ENCOUNTER — TELEMEDICINE (OUTPATIENT)
Dept: NEUROLOGY | Facility: CLINIC | Age: 45
End: 2020-05-12

## 2020-05-12 DIAGNOSIS — R56.9 SEIZURES (HCC): Primary | ICD-10-CM

## 2020-05-12 PROCEDURE — 99213 OFFICE O/P EST LOW 20 MIN: CPT | Performed by: OTHER

## 2020-05-12 NOTE — PROGRESS NOTES
Please note that the following visit was completed using two-way, real-time interactive audio and/or video communication.   This has been done in good debbie to provide continuity of care in the best interest of the provider-patient relationship, due to the sense\". She then started having BUE and THERESE tonic clonic shaking. She bit her tongue. No incontinence. She was grunting and eyes were rolled into back of her head. Her  called 911 and started chest compressions.  Per mother she had a total of 5 seiz List:     Psoriasis     HTN (hypertension)     Smoking addiction     Impaired fasting glucose     Vitamin D deficiency     Depression     Coronary artery disease involving native coronary artery of native heart without angina pectoris     Cardiac defibrill not currently breastfeeding.     Gen: Awake and in no apparent distress  HEENT: moist mucus membranes  Neck: Supple     Pulm: NO increased work of breathing     Skin: normal, dry         Neurologic:   MENTAL STATUS: alert, ox3, normal attention, language a back burners to cook (as opposed to front burners)  No driving    RTC in 6 months    DO Casimiro Patel

## 2020-06-10 ENCOUNTER — OFFICE VISIT (OUTPATIENT)
Dept: INTERNAL MEDICINE CLINIC | Facility: CLINIC | Age: 45
End: 2020-06-10

## 2020-06-10 VITALS
SYSTOLIC BLOOD PRESSURE: 118 MMHG | OXYGEN SATURATION: 98 % | WEIGHT: 166.5 LBS | HEART RATE: 82 BPM | BODY MASS INDEX: 30.64 KG/M2 | TEMPERATURE: 98 F | RESPIRATION RATE: 12 BRPM | DIASTOLIC BLOOD PRESSURE: 70 MMHG | HEIGHT: 62 IN

## 2020-06-10 DIAGNOSIS — I10 ESSENTIAL HYPERTENSION: Primary | ICD-10-CM

## 2020-06-10 DIAGNOSIS — R41.89 COGNITIVE CHANGE: ICD-10-CM

## 2020-06-10 DIAGNOSIS — I25.10 CORONARY ARTERY DISEASE INVOLVING NATIVE CORONARY ARTERY OF NATIVE HEART WITHOUT ANGINA PECTORIS: ICD-10-CM

## 2020-06-10 DIAGNOSIS — F17.200 SMOKING ADDICTION: ICD-10-CM

## 2020-06-10 PROCEDURE — 99214 OFFICE O/P EST MOD 30 MIN: CPT | Performed by: FAMILY MEDICINE

## 2020-06-10 NOTE — PROGRESS NOTES
HPI:    Patient ID: Manpreet Mullins is a 53year old female. HPI  Manpreet Mullins is a 53year old female. HPI:   Pt is here for med ck/follow up. Overall is doing well. Is taking meds as directed.   No issues w medications      Lost weight w exe Years: 0.00        Pack years: 0        Quit date: 2018        Years since quittin.8      Smokeless tobacco: Never Used    Alcohol use: No      Frequency: Never    Drug use: No       REVIEW OF SYSTEMS:   GENERAL HEALTH: feels well otherwise  SK • Cholecalciferol (VITAMIN D) 2000 units Oral Tab Take 4,000 Units by mouth daily. • aspirin 81 MG Oral Tab Take 81 mg by mouth daily. • lisinopril 2.5 MG Oral Tab Take 5 mg by mouth daily.        • atorvastatin 80 MG Oral Tab Take 40 mg by mouth

## 2020-06-24 RX ORDER — ATORVASTATIN CALCIUM 40 MG/1
TABLET, FILM COATED ORAL
Qty: 90 TABLET | Refills: 2 | Status: SHIPPED | OUTPATIENT
Start: 2020-06-24

## 2020-07-03 RX ORDER — PAROXETINE 30 MG/1
TABLET, FILM COATED ORAL
Qty: 30 TABLET | Refills: 5 | Status: SHIPPED | OUTPATIENT
Start: 2020-07-03 | End: 2021-01-04

## 2020-07-03 NOTE — TELEPHONE ENCOUNTER
PARoxetine HCl 30 MG Oral Tablet  No Protocol     LOV: 6/10/2020   RTC: 3 months   Upcoming    Filled:  2020 #30, 2 refills

## 2020-07-20 RX ORDER — CLOPIDOGREL BISULFATE 75 MG/1
TABLET ORAL
Qty: 90 TABLET | Refills: 3 | Status: SHIPPED | OUTPATIENT
Start: 2020-07-20

## 2020-07-21 DIAGNOSIS — R56.9 SEIZURES (HCC): Primary | ICD-10-CM

## 2020-07-21 RX ORDER — LEVETIRACETAM 500 MG/1
TABLET ORAL
Qty: 270 TABLET | Refills: 0 | Status: SHIPPED | OUTPATIENT
Start: 2020-07-21 | End: 2020-10-26

## 2020-07-21 NOTE — TELEPHONE ENCOUNTER
Medication: LEVETIRACETAM 500 MG Oral Tab    Date of last refill: 01/16/2020 (#270/1)  [place]    Last office visit: 05/12/2020  Due back to clinic per last office note:  Around 11/12/2020  Date next office visit kane

## 2020-07-30 ENCOUNTER — LABORATORY ENCOUNTER (OUTPATIENT)
Dept: LAB | Age: 45
End: 2020-07-30
Attending: INTERNAL MEDICINE
Payer: COMMERCIAL

## 2020-07-30 DIAGNOSIS — E55.9 AVITAMINOSIS D: ICD-10-CM

## 2020-07-30 DIAGNOSIS — I50.22 CHRONIC SYSTOLIC HEART FAILURE (HCC): ICD-10-CM

## 2020-07-30 DIAGNOSIS — I50.20 SYSTOLIC HEART FAILURE (HCC): Primary | ICD-10-CM

## 2020-07-30 DIAGNOSIS — E78.00 PURE HYPERCHOLESTEROLEMIA: ICD-10-CM

## 2020-07-30 LAB
ALBUMIN SERPL-MCNC: 3.4 G/DL
ALBUMIN SERPL-MCNC: 3.4 G/DL (ref 3.4–5)
ALBUMIN/GLOB SERPL: 0.9 {RATIO} (ref 1–2)
ALP LIVER SERPL-CCNC: 105 U/L (ref 39–100)
ALP SERPL-CCNC: 105 U/L
ALT SERPL-CCNC: 23 U/L (ref 13–56)
ALT SERPL-CCNC: 23 UNITS/L
ANION GAP SERPL CALC-SCNC: 5 MMOL/L (ref 0–18)
AST SERPL-CCNC: 10 U/L (ref 15–37)
AST SERPL-CCNC: 10 UNITS/L
BASOPHILS # BLD AUTO: 0.05 X10(3) UL (ref 0–0.2)
BASOPHILS NFR BLD AUTO: 0.6 %
BILIRUB SERPL-MCNC: 0.4 MG/DL
BILIRUB SERPL-MCNC: 0.4 MG/DL (ref 0.1–2)
BUN BLD-MCNC: 16 MG/DL (ref 7–18)
BUN SERPL-MCNC: 16 MG/DL
BUN/CREAT SERPL: 17 (ref 10–20)
CALCIUM BLD-MCNC: 9.3 MG/DL (ref 8.5–10.1)
CALCIUM SERPL-MCNC: 9.3 MG/DL
CHLORIDE SERPL-SCNC: 106 MMOL/L
CHLORIDE SERPL-SCNC: 106 MMOL/L (ref 98–112)
CHOLEST SERPL-MCNC: 98 MG/DL
CHOLEST SMN-MCNC: 98 MG/DL (ref ?–200)
CO2 SERPL-SCNC: 29 MMOL/L (ref 21–32)
CREAT BLD-MCNC: 0.94 MG/DL (ref 0.55–1.02)
CREAT SERPL-MCNC: 0.94 MG/DL
DEPRECATED RDW RBC AUTO: 42.2 FL (ref 35.1–46.3)
EOSINOPHIL # BLD AUTO: 0.22 X10(3) UL (ref 0–0.7)
EOSINOPHIL NFR BLD AUTO: 2.8 %
ERYTHROCYTE [DISTWIDTH] IN BLOOD BY AUTOMATED COUNT: 12.2 % (ref 11–15)
GLOBULIN PLAS-MCNC: 3.7 G/DL (ref 2.8–4.4)
GLOBULIN SER-MCNC: 3.7 G/DL
GLUCOSE BLD-MCNC: 106 MG/DL (ref 70–99)
GLUCOSE SERPL-MCNC: 106 MG/DL
HAV IGM SER QL: 2.2 MG/DL (ref 1.6–2.6)
HCT VFR BLD AUTO: 43.8 % (ref 35–48)
HCT VFR BLD CALC: 43.8 %
HDLC SERPL-MCNC: 37 MG/DL
HDLC SERPL-MCNC: 37 MG/DL (ref 40–59)
HGB BLD-MCNC: 14.4 G/DL
HGB BLD-MCNC: 14.4 G/DL (ref 12–16)
IMM GRANULOCYTES # BLD AUTO: 0.03 X10(3) UL (ref 0–1)
IMM GRANULOCYTES NFR BLD: 0.4 %
LDLC SERPL CALC-MCNC: 45 MG/DL
LDLC SERPL CALC-MCNC: 45 MG/DL (ref ?–100)
LYMPHOCYTES # BLD AUTO: 2.47 X10(3) UL (ref 1–4)
LYMPHOCYTES NFR BLD AUTO: 31.4 %
M PROTEIN MFR SERPL ELPH: 7.1 G/DL (ref 6.4–8.2)
MAGNESIUM SERPL-MCNC: 2.2 MG/DL
MCH RBC QN AUTO: 30.6 PG (ref 26–34)
MCHC RBC AUTO-ENTMCNC: 32.9 G/DL (ref 31–37)
MCV RBC AUTO: 93.2 FL (ref 80–100)
MONOCYTES # BLD AUTO: 0.61 X10(3) UL (ref 0.1–1)
MONOCYTES NFR BLD AUTO: 7.8 %
NEUTROPHILS # BLD AUTO: 4.48 X10 (3) UL (ref 1.5–7.7)
NEUTROPHILS # BLD AUTO: 4.48 X10(3) UL (ref 1.5–7.7)
NEUTROPHILS NFR BLD AUTO: 57 %
NONHDLC SERPL-MCNC: 61 MG/DL
NONHDLC SERPL-MCNC: 61 MG/DL (ref ?–130)
NT-PROBNP SERPL-MCNC: 490 PG/ML
NT-PROBNP SERPL-MCNC: 490 PG/ML (ref ?–125)
OSMOLALITY SERPL CALC.SUM OF ELEC: 292 MOSM/KG (ref 275–295)
PATIENT FASTING Y/N/NP: YES
PATIENT FASTING Y/N/NP: YES
PLATELET # BLD AUTO: 162 10(3)UL (ref 150–450)
PLATELET # BLD: 162 K/MCL
POTASSIUM SERPL-SCNC: 3.9 MMOL/L
POTASSIUM SERPL-SCNC: 3.9 MMOL/L (ref 3.5–5.1)
PROT SERPL-MCNC: 7.1 G/DL
RBC # BLD AUTO: 4.7 X10(6)UL (ref 3.8–5.3)
RBC # BLD: 4.7 10*6/UL
SODIUM SERPL-SCNC: 140 MMOL/L
SODIUM SERPL-SCNC: 140 MMOL/L (ref 136–145)
TRIGL SERPL-MCNC: 81 MG/DL
TRIGL SERPL-MCNC: 81 MG/DL (ref 30–149)
TSH SERPL-ACNC: 2.26 MCUNITS/ML
TSI SER-ACNC: 2.26 MIU/ML (ref 0.36–3.74)
VIT D+METAB SERPL-MCNC: 54.5 NG/ML (ref 30–100)
VLDLC SERPL CALC-MCNC: 16 MG/DL (ref 0–30)
WBC # BLD AUTO: 7.9 X10(3) UL (ref 4–11)
WBC # BLD: 7.9 K/MCL

## 2020-07-30 PROCEDURE — 83880 ASSAY OF NATRIURETIC PEPTIDE: CPT

## 2020-07-30 PROCEDURE — 82306 VITAMIN D 25 HYDROXY: CPT

## 2020-07-30 PROCEDURE — 85025 COMPLETE CBC W/AUTO DIFF WBC: CPT

## 2020-07-30 PROCEDURE — 80061 LIPID PANEL: CPT

## 2020-07-30 PROCEDURE — 84443 ASSAY THYROID STIM HORMONE: CPT

## 2020-07-30 PROCEDURE — 83735 ASSAY OF MAGNESIUM: CPT

## 2020-07-30 PROCEDURE — 80053 COMPREHEN METABOLIC PANEL: CPT

## 2020-07-30 PROCEDURE — 36415 COLL VENOUS BLD VENIPUNCTURE: CPT

## 2020-07-31 ENCOUNTER — CLINICAL ABSTRACT (OUTPATIENT)
Dept: CARDIOLOGY | Age: 45
End: 2020-07-31

## 2020-08-05 ENCOUNTER — OFFICE VISIT (OUTPATIENT)
Dept: CARDIOLOGY | Age: 45
End: 2020-08-05

## 2020-08-05 VITALS
HEART RATE: 84 BPM | BODY MASS INDEX: 27.82 KG/M2 | SYSTOLIC BLOOD PRESSURE: 110 MMHG | HEIGHT: 63 IN | RESPIRATION RATE: 18 BRPM | WEIGHT: 157 LBS | DIASTOLIC BLOOD PRESSURE: 70 MMHG

## 2020-08-05 DIAGNOSIS — I25.5 ISCHEMIC CARDIOMYOPATHY: ICD-10-CM

## 2020-08-05 DIAGNOSIS — E55.9 VITAMIN D DEFICIENCY: Primary | ICD-10-CM

## 2020-08-05 DIAGNOSIS — I50.22 HEART FAILURE, LEFT SYSTOLIC, CHRONIC (CMD): ICD-10-CM

## 2020-08-05 DIAGNOSIS — E78.00 PURE HYPERCHOLESTEROLEMIA: ICD-10-CM

## 2020-08-05 PROCEDURE — 99214 OFFICE O/P EST MOD 30 MIN: CPT | Performed by: INTERNAL MEDICINE

## 2020-08-05 RX ORDER — CALCIPOTRIENE 50 UG/G
1 OINTMENT TOPICAL NIGHTLY
COMMUNITY
Start: 2020-05-05

## 2020-08-05 RX ORDER — LISINOPRIL 5 MG/1
5 TABLET ORAL DAILY
Status: SHIPPED | COMMUNITY
Start: 2020-08-05 | End: 2020-08-05 | Stop reason: DRUGHIGH

## 2020-08-05 RX ORDER — LISINOPRIL 2.5 MG/1
2.5 TABLET ORAL DAILY
Qty: 90 TABLET | Refills: 3 | Status: SHIPPED | OUTPATIENT
Start: 2020-08-05

## 2020-08-05 ASSESSMENT — ENCOUNTER SYMPTOMS
WEIGHT LOSS: 1
BRUISES/BLEEDS EASILY: 0
SUSPICIOUS LESIONS: 0
HEMATOCHEZIA: 0
ALLERGIC/IMMUNOLOGIC COMMENTS: NO NEW FOOD ALLERGIES
DIZZINESS: 1
COUGH: 0
CHILLS: 0
HEMOPTYSIS: 0
FEVER: 0
WEIGHT GAIN: 0

## 2020-08-05 ASSESSMENT — PATIENT HEALTH QUESTIONNAIRE - PHQ9
CLINICAL INTERPRETATION OF PHQ9 SCORE: NO FURTHER SCREENING NEEDED
1. LITTLE INTEREST OR PLEASURE IN DOING THINGS: NOT AT ALL
2. FEELING DOWN, DEPRESSED OR HOPELESS: NOT AT ALL
CLINICAL INTERPRETATION OF PHQ2 SCORE: NO FURTHER SCREENING NEEDED
SUM OF ALL RESPONSES TO PHQ9 QUESTIONS 1 AND 2: 0
SUM OF ALL RESPONSES TO PHQ9 QUESTIONS 1 AND 2: 0

## 2020-08-11 ENCOUNTER — TELEPHONE (OUTPATIENT)
Dept: INTERNAL MEDICINE CLINIC | Facility: CLINIC | Age: 45
End: 2020-08-11

## 2020-08-11 DIAGNOSIS — L40.9 PSORIASIS: Primary | ICD-10-CM

## 2020-08-11 NOTE — TELEPHONE ENCOUNTER
René COBIAN Emg 08 Clinical Staff Cc: ARANZA [facility] Referral Pool   Phone Number: [phone]             .Reason for the order/referral:DERMATOLOGY/F/UP   PCP: Chelsey Raza   Refer to [address] [place] [address]   Patient Insurance:

## 2020-08-29 ENCOUNTER — HOSPITAL ENCOUNTER (EMERGENCY)
Facility: HOSPITAL | Age: 45
Discharge: HOME | End: 2020-08-29
Attending: EMERGENCY MEDICINE
Payer: COMMERCIAL

## 2020-08-29 ENCOUNTER — APPOINTMENT (OUTPATIENT)
Dept: CT IMAGING | Facility: HOSPITAL | Age: 45
End: 2020-08-29
Attending: EMERGENCY MEDICINE
Payer: COMMERCIAL

## 2020-08-29 VITALS
RESPIRATION RATE: 19 BRPM | DIASTOLIC BLOOD PRESSURE: 66 MMHG | WEIGHT: 166.44 LBS | BODY MASS INDEX: 30 KG/M2 | SYSTOLIC BLOOD PRESSURE: 128 MMHG | HEART RATE: 92 BPM | OXYGEN SATURATION: 95 %

## 2020-08-29 DIAGNOSIS — G40.909 SEIZURE DISORDER (HCC): Primary | ICD-10-CM

## 2020-08-29 LAB
ALBUMIN SERPL-MCNC: 3.3 G/DL (ref 3.4–5)
ALBUMIN/GLOB SERPL: 0.9 {RATIO} (ref 1–2)
ALP LIVER SERPL-CCNC: 108 U/L (ref 39–100)
ALT SERPL-CCNC: 21 U/L (ref 13–56)
ANION GAP SERPL CALC-SCNC: 7 MMOL/L (ref 0–18)
AST SERPL-CCNC: 16 U/L (ref 15–37)
BASOPHILS # BLD AUTO: 0.07 X10(3) UL (ref 0–0.2)
BASOPHILS NFR BLD AUTO: 0.7 %
BILIRUB SERPL-MCNC: 0.5 MG/DL (ref 0.1–2)
BUN BLD-MCNC: 12 MG/DL (ref 7–18)
BUN/CREAT SERPL: 14.6 (ref 10–20)
CALCIUM BLD-MCNC: 9.3 MG/DL (ref 8.5–10.1)
CHLORIDE SERPL-SCNC: 110 MMOL/L (ref 98–112)
CO2 SERPL-SCNC: 21 MMOL/L (ref 21–32)
CREAT BLD-MCNC: 0.82 MG/DL (ref 0.55–1.02)
DEPRECATED RDW RBC AUTO: 41.2 FL (ref 35.1–46.3)
EOSINOPHIL # BLD AUTO: 0.14 X10(3) UL (ref 0–0.7)
EOSINOPHIL NFR BLD AUTO: 1.4 %
ERYTHROCYTE [DISTWIDTH] IN BLOOD BY AUTOMATED COUNT: 12.4 % (ref 11–15)
GLOBULIN PLAS-MCNC: 3.8 G/DL (ref 2.8–4.4)
GLUCOSE BLD-MCNC: 102 MG/DL (ref 70–99)
GLUCOSE BLD-MCNC: 106 MG/DL (ref 70–99)
HCT VFR BLD AUTO: 44 % (ref 35–48)
HGB BLD-MCNC: 14.5 G/DL (ref 12–16)
IMM GRANULOCYTES # BLD AUTO: 0.02 X10(3) UL (ref 0–1)
IMM GRANULOCYTES NFR BLD: 0.2 %
LYMPHOCYTES # BLD AUTO: 1.91 X10(3) UL (ref 1–4)
LYMPHOCYTES NFR BLD AUTO: 18.5 %
M PROTEIN MFR SERPL ELPH: 7.1 G/DL (ref 6.4–8.2)
MCH RBC QN AUTO: 30.2 PG (ref 26–34)
MCHC RBC AUTO-ENTMCNC: 33 G/DL (ref 31–37)
MCV RBC AUTO: 91.7 FL (ref 80–100)
MONOCYTES # BLD AUTO: 0.57 X10(3) UL (ref 0.1–1)
MONOCYTES NFR BLD AUTO: 5.5 %
NEUTROPHILS # BLD AUTO: 7.59 X10 (3) UL (ref 1.5–7.7)
NEUTROPHILS # BLD AUTO: 7.59 X10(3) UL (ref 1.5–7.7)
NEUTROPHILS NFR BLD AUTO: 73.7 %
OSMOLALITY SERPL CALC.SUM OF ELEC: 286 MOSM/KG (ref 275–295)
PLATELET # BLD AUTO: 187 10(3)UL (ref 150–450)
POTASSIUM SERPL-SCNC: 4.4 MMOL/L (ref 3.5–5.1)
RBC # BLD AUTO: 4.8 X10(6)UL (ref 3.8–5.3)
SODIUM SERPL-SCNC: 138 MMOL/L (ref 136–145)
WBC # BLD AUTO: 10.3 X10(3) UL (ref 4–11)

## 2020-08-29 PROCEDURE — 70450 CT HEAD/BRAIN W/O DYE: CPT | Performed by: EMERGENCY MEDICINE

## 2020-08-29 PROCEDURE — 80053 COMPREHEN METABOLIC PANEL: CPT | Performed by: EMERGENCY MEDICINE

## 2020-08-29 PROCEDURE — 85025 COMPLETE CBC W/AUTO DIFF WBC: CPT | Performed by: EMERGENCY MEDICINE

## 2020-08-29 PROCEDURE — 99285 EMERGENCY DEPT VISIT HI MDM: CPT

## 2020-08-29 PROCEDURE — 82962 GLUCOSE BLOOD TEST: CPT

## 2020-08-29 PROCEDURE — 96361 HYDRATE IV INFUSION ADD-ON: CPT

## 2020-08-29 PROCEDURE — 96365 THER/PROPH/DIAG IV INF INIT: CPT

## 2020-08-29 PROCEDURE — 96375 TX/PRO/DX INJ NEW DRUG ADDON: CPT

## 2020-08-29 RX ORDER — LORAZEPAM 2 MG/ML
1 INJECTION INTRAMUSCULAR ONCE
Status: COMPLETED | OUTPATIENT
Start: 2020-08-29 | End: 2020-08-29

## 2020-08-29 RX ORDER — SODIUM CHLORIDE 9 MG/ML
INJECTION, SOLUTION INTRAVENOUS CONTINUOUS
Status: DISCONTINUED | OUTPATIENT
Start: 2020-08-29 | End: 2020-08-30

## 2020-08-29 NOTE — ED NOTES
Mother yelled out \"she's seizing\"  Pt noted with LLE jerky moves and EMMY with jerky moves, eyes noted to be rolling back and pt was grunting and clenching mouth with stiffening torso.   Lasted about a minute, no loss of urine control, ER MD at bedside

## 2020-08-29 NOTE — ED INITIAL ASSESSMENT (HPI)
Alert appears post-tictal, per EMS, slowly started to fall in bed and saw jerky eyes and no jerky extremities. Per mother confused and right leg was not responding.

## 2020-08-29 NOTE — ED PROVIDER NOTES
Patient Seen in: [facility] Emergency Department      History   Patient presents with:  Seizure Disorder    Stated Complaint: SEIZURES    HPI    75-year-old woman with a history of CVA/seizure disorder as well as CAD/MI, ischemic cardiomyopathy (eje negative except as noted above.     Physical Exam     ED Triage Vitals [08/29/20 1407]   /70   Pulse 74   Resp 15   Temp    Temp src    SpO2 96 %   O2 Device None (Room air)       Current:/51   Pulse 94   Resp 22   Wt 75.5 kg   LMP 01/28/2020 (A DIFFERENTIAL          Ct Brain Or Head (47091)    Result Date: 8/29/2020  PROCEDURE:  CT BRAIN OR HEAD (24176)  COMPARISON:  External Exams, MR, MRI BRAIN (W+WO) (CPT=70553), 9/05/2019, 7:16 PM.  External Exams, CT, CT HEAD/BRAIN WO CONTRAST, 9/05/2019, 5: discussed with the patient the results of test, differential diagnosis, treatment plan, warning signs and symptoms which should prompt immediate return. The patient expressed understanding of these instructions and agrees to the following plan provided.

## 2020-08-30 NOTE — ED NOTES
Patient up to bathroom. Gait is unsteady, she answers questions appropriately but is slow to respond.  MD notified

## 2020-08-30 NOTE — ED NOTES
Family remains at the bedside. Patient is sleeping and appears comfortable. Respirations easy and regular.

## 2020-09-01 ENCOUNTER — PATIENT MESSAGE (OUTPATIENT)
Dept: NEUROLOGY | Facility: CLINIC | Age: 45
End: 2020-09-01

## 2020-09-01 NOTE — TELEPHONE ENCOUNTER
From: Karen Molina  To: José Miguel Qureshi DO  Sent: 9/1/2020 11:18 AM CDT  Subject: Non-Urgent Medical Question    please review ct from 8-29 , lukasz ayala.  Please advise as to medication and/or scheduling an appointment

## 2020-09-06 DIAGNOSIS — I25.5 ISCHEMIC CARDIOMYOPATHY: ICD-10-CM

## 2020-09-06 DIAGNOSIS — I50.22 HEART FAILURE, LEFT SYSTOLIC, CHRONIC (CMD): ICD-10-CM

## 2020-09-08 RX ORDER — LISINOPRIL 2.5 MG/1
2.5 TABLET ORAL DAILY
Qty: 90 TABLET | Refills: 3 | Status: SHIPPED | OUTPATIENT
Start: 2020-09-08 | End: 2021-02-03 | Stop reason: SDUPTHER

## 2020-09-17 ENCOUNTER — OFFICE VISIT (OUTPATIENT)
Dept: INTERNAL MEDICINE CLINIC | Facility: CLINIC | Age: 45
End: 2020-09-17

## 2020-09-17 VITALS
TEMPERATURE: 98 F | HEART RATE: 76 BPM | BODY MASS INDEX: 29.63 KG/M2 | DIASTOLIC BLOOD PRESSURE: 70 MMHG | OXYGEN SATURATION: 98 % | HEIGHT: 62 IN | RESPIRATION RATE: 16 BRPM | WEIGHT: 161 LBS | SYSTOLIC BLOOD PRESSURE: 118 MMHG

## 2020-09-17 DIAGNOSIS — F17.200 SMOKING ADDICTION: ICD-10-CM

## 2020-09-17 DIAGNOSIS — I25.10 CORONARY ARTERY DISEASE INVOLVING NATIVE CORONARY ARTERY OF NATIVE HEART WITHOUT ANGINA PECTORIS: Primary | ICD-10-CM

## 2020-09-17 DIAGNOSIS — I10 ESSENTIAL HYPERTENSION: ICD-10-CM

## 2020-09-17 DIAGNOSIS — R56.9 SEIZURES (HCC): ICD-10-CM

## 2020-09-17 DIAGNOSIS — R41.3 MEMORY DEFICIT: ICD-10-CM

## 2020-09-17 DIAGNOSIS — H61.23 BILATERAL IMPACTED CERUMEN: ICD-10-CM

## 2020-09-17 PROCEDURE — 69209 REMOVE IMPACTED EAR WAX UNI: CPT | Performed by: FAMILY MEDICINE

## 2020-09-17 PROCEDURE — 99214 OFFICE O/P EST MOD 30 MIN: CPT | Performed by: FAMILY MEDICINE

## 2020-09-17 PROCEDURE — 3008F BODY MASS INDEX DOCD: CPT | Performed by: FAMILY MEDICINE

## 2020-09-17 PROCEDURE — 3074F SYST BP LT 130 MM HG: CPT | Performed by: FAMILY MEDICINE

## 2020-09-17 PROCEDURE — 3078F DIAST BP <80 MM HG: CPT | Performed by: FAMILY MEDICINE

## 2020-09-17 NOTE — PROGRESS NOTES
Noel Arce is a 53year old female. HPI:   Pt is here for med ck/follow up from June. Overall is doing well. Is taking meds as directed. No issues w medications. Is staying healthy given the [place] pandemic.      Had sz last month   Seen in the disorder [facility])       Social History:  Social History    Tobacco Use      Smoking status: Current Every Day Smoker        Packs/day: 0.00        Years: 0.00        Pack years: 0        Quit date: 2018        Years since quittin.1      Smokeless tob

## 2020-10-22 DIAGNOSIS — R56.9 SEIZURES (HCC): ICD-10-CM

## 2020-10-22 NOTE — TELEPHONE ENCOUNTER
Medication: LEVETIRACETAM 500 MG Oral Tab    Date of last refill: 07/21/2020 (#270/0)  [place]    Last office visit: 05/12/2020  Due back to clinic per last office note:  Around 11/12/2020  Date next office visit kane

## 2020-10-26 ENCOUNTER — TELEPHONE (OUTPATIENT)
Dept: NEUROLOGY | Facility: CLINIC | Age: 45
End: 2020-10-26

## 2020-10-26 DIAGNOSIS — R56.9 SEIZURES (HCC): ICD-10-CM

## 2020-10-26 RX ORDER — LEVETIRACETAM 500 MG/1
TABLET ORAL
Qty: 270 TABLET | Refills: 0 | Status: SHIPPED | OUTPATIENT
Start: 2020-10-26 | End: 2021-01-19

## 2020-10-26 NOTE — TELEPHONE ENCOUNTER
Per chart review, medication already refilled by Magda Ghosh.     Medication: Levetiracetam 500 mg oral tab    Date of last refill: 10/26/2020 (#270/0)  [place]    Last office visit: 5/12/2020  Due back to clinic per

## 2020-10-27 ENCOUNTER — TELEPHONE (OUTPATIENT)
Dept: NEUROLOGY | Facility: CLINIC | Age: 45
End: 2020-10-27

## 2020-10-28 NOTE — TELEPHONE ENCOUNTER
I called Ms. Yeager back at her request. She states that she just wanted to see if I was going to refill her Keppra. I have already filled this and she confirmed that it was indeed sent to her pharmacy.  No further questions

## 2021-01-04 RX ORDER — PAROXETINE 30 MG/1
30 TABLET, FILM COATED ORAL DAILY
Qty: 30 TABLET | Refills: 5 | Status: SHIPPED | OUTPATIENT
Start: 2021-01-04

## 2021-01-19 DIAGNOSIS — R56.9 SEIZURES (HCC): ICD-10-CM

## 2021-01-19 RX ORDER — LEVETIRACETAM 500 MG/1
TABLET ORAL
Qty: 90 TABLET | Refills: 0 | Status: SHIPPED | OUTPATIENT
Start: 2021-01-19 | End: 2021-02-17

## 2021-01-19 NOTE — TELEPHONE ENCOUNTER
LMTCB and schedule an appt for further medication refills.        Medication: LEVETIRACETAM 500 MG Oral Tab    Date of last refill: 10/26/2020 (#270/0)  [place]    Last office visit: 05/12/2020  Due back to clinic per

## 2021-01-22 DIAGNOSIS — R56.9 SEIZURES (HCC): ICD-10-CM

## 2021-01-22 RX ORDER — LEVETIRACETAM 500 MG/1
TABLET ORAL
Qty: 270 TABLET | Refills: 0 | OUTPATIENT
Start: 2021-01-22

## 2021-02-03 ENCOUNTER — APPOINTMENT (OUTPATIENT)
Dept: CARDIOLOGY | Age: 46
End: 2021-02-03

## 2021-02-03 ENCOUNTER — V-VISIT (OUTPATIENT)
Dept: CARDIOLOGY | Age: 46
End: 2021-02-03

## 2021-02-03 DIAGNOSIS — I50.22 HEART FAILURE, LEFT SYSTOLIC, CHRONIC (CMD): Primary | ICD-10-CM

## 2021-02-03 PROCEDURE — 99213 OFFICE O/P EST LOW 20 MIN: CPT | Performed by: INTERNAL MEDICINE

## 2021-02-03 ASSESSMENT — ENCOUNTER SYMPTOMS
WEIGHT GAIN: 0
HEMOPTYSIS: 0
WEIGHT LOSS: 1
SUSPICIOUS LESIONS: 0
ALLERGIC/IMMUNOLOGIC COMMENTS: NO NEW FOOD ALLERGIES
CHILLS: 0
BRUISES/BLEEDS EASILY: 0
HEMATOCHEZIA: 0
COUGH: 0
FEVER: 0
LOSS OF BALANCE: 1

## 2021-02-03 ASSESSMENT — PATIENT HEALTH QUESTIONNAIRE - PHQ9
2. FEELING DOWN, DEPRESSED OR HOPELESS: NOT AT ALL
1. LITTLE INTEREST OR PLEASURE IN DOING THINGS: NOT AT ALL
SUM OF ALL RESPONSES TO PHQ9 QUESTIONS 1 AND 2: 0
CLINICAL INTERPRETATION OF PHQ9 SCORE: NO FURTHER SCREENING NEEDED
CLINICAL INTERPRETATION OF PHQ2 SCORE: NO FURTHER SCREENING NEEDED
SUM OF ALL RESPONSES TO PHQ9 QUESTIONS 1 AND 2: 0

## 2021-02-05 ENCOUNTER — TELEPHONE (OUTPATIENT)
Dept: CARDIOLOGY | Age: 46
End: 2021-02-05

## 2021-02-09 ENCOUNTER — TELEPHONE (OUTPATIENT)
Dept: INTERNAL MEDICINE CLINIC | Facility: CLINIC | Age: 46
End: 2021-02-09

## 2021-02-09 NOTE — TELEPHONE ENCOUNTER
Spoke with patient and patient confirmed she now has [facility] [employer] as insurance, but has no PCP listed yet.     Patient has moved to [place] and will be listing Dr Dannie Arana as her new PCP

## 2021-02-17 DIAGNOSIS — R56.9 SEIZURES (HCC): ICD-10-CM

## 2021-02-17 RX ORDER — LEVETIRACETAM 750 MG/1
750 TABLET ORAL 2 TIMES DAILY
Qty: 60 TABLET | Refills: 0 | Status: SHIPPED | OUTPATIENT
Start: 2021-02-17

## 2021-02-17 NOTE — TELEPHONE ENCOUNTER
LM to schedule appt.  30 day supply pended    Medication: keppra    Date of last refill: 1/19/21 (#90/0)  [place]    Last office visit: 5/12/20  Due back to clinic per last office note:  6 months  Date next office visi

## 2021-03-10 ENCOUNTER — TELEPHONE (OUTPATIENT)
Dept: NEUROLOGY | Facility: CLINIC | Age: 46
End: 2021-03-10

## 2021-03-18 DIAGNOSIS — Z23 NEED FOR VACCINATION: ICD-10-CM

## 2023-11-22 NOTE — ED NOTES
Provider Staff:  Action required for this patient    Requires appointment      Please see care gap opportunities below in Care Due Message.    Thanks!  [facility]     Appointments      Date Provider   Last Visit   1/24/2023 Geovanni Britton MD   Next Visit   Visit date not found Geovanni Britton MD     Refill Decision Note   Asha Paige  is requesting a refill authorization.  Brief Assessment and Rationale for Refill:  Approve     Medication Therapy Plan:         Comments:     Note composed:6:07 PM 11/21/2023             Appointments     Last Visit   1/24/2023 Geovanni Britton MD   Next Visit   Visit date not found Geovanni Britton MD       Family at the bedside. Patient is sleeping and appears comfortable. Respirations easy and regular.  Skin p/w/d

## (undated) NOTE — LETTER
10/19/18        Kelly Deutsch [id number]      Dear Rhonda Vera,    [facility] records indicate that you have outstanding lab work and or testing that was ordered for you and has not yet been completed:  Orders Placed This Encounter